# Patient Record
Sex: FEMALE | Race: WHITE | NOT HISPANIC OR LATINO | Employment: OTHER | URBAN - METROPOLITAN AREA
[De-identification: names, ages, dates, MRNs, and addresses within clinical notes are randomized per-mention and may not be internally consistent; named-entity substitution may affect disease eponyms.]

---

## 2017-07-18 DIAGNOSIS — Z12.31 ENCOUNTER FOR SCREENING MAMMOGRAM FOR MALIGNANT NEOPLASM OF BREAST: ICD-10-CM

## 2017-08-09 ENCOUNTER — HOSPITAL ENCOUNTER (OUTPATIENT)
Dept: RADIOLOGY | Age: 52
Discharge: HOME | End: 2017-08-09
Payer: COMMERCIAL

## 2017-08-09 DIAGNOSIS — Z12.31 ENCOUNTER FOR SCREENING MAMMOGRAM FOR MALIGNANT NEOPLASM OF BREAST: ICD-10-CM

## 2017-08-09 PROCEDURE — G0202 SCR MAMMO BI INCL CAD: HCPCS

## 2017-10-10 ENCOUNTER — ALLSCRIPTS OFFICE VISIT (OUTPATIENT)
Dept: OTHER | Facility: OTHER | Age: 52
End: 2017-10-10

## 2017-10-16 LAB
HPV 18 (HISTORICAL): NOT DETECTED
HPV HIGH RISK 16/18 (HISTORICAL): NOT DETECTED
HPV16 (HISTORICAL): NOT DETECTED
PAP (HISTORICAL): NORMAL

## 2018-01-22 VITALS
WEIGHT: 164 LBS | BODY MASS INDEX: 27.32 KG/M2 | SYSTOLIC BLOOD PRESSURE: 130 MMHG | DIASTOLIC BLOOD PRESSURE: 82 MMHG | HEIGHT: 65 IN

## 2018-07-16 ENCOUNTER — TELEPHONE (OUTPATIENT)
Dept: OBGYN CLINIC | Facility: CLINIC | Age: 53
End: 2018-07-16

## 2018-07-16 DIAGNOSIS — Z12.39 SCREENING FOR MALIGNANT NEOPLASM OF BREAST: Primary | ICD-10-CM

## 2018-07-16 NOTE — PROGRESS NOTES
New order for mammo entered, cannot discontinue old order because attached to appointment, but old order not signed

## 2018-08-14 ENCOUNTER — TELEPHONE (OUTPATIENT)
Dept: OBGYN CLINIC | Facility: CLINIC | Age: 53
End: 2018-08-14

## 2018-08-14 DIAGNOSIS — Z12.31 ENCOUNTER FOR SCREENING MAMMOGRAM FOR MALIGNANT NEOPLASM OF BREAST: Primary | ICD-10-CM

## 2018-08-15 ENCOUNTER — HOSPITAL ENCOUNTER (OUTPATIENT)
Dept: RADIOLOGY | Age: 53
Discharge: HOME | End: 2018-08-15
Payer: COMMERCIAL

## 2018-08-15 ENCOUNTER — TRANSCRIBE ORDERS (OUTPATIENT)
Dept: ADMINISTRATIVE | Age: 53
End: 2018-08-15

## 2018-08-15 DIAGNOSIS — Z12.31 ENCOUNTER FOR SCREENING MAMMOGRAM FOR MALIGNANT NEOPLASM OF BREAST: ICD-10-CM

## 2018-08-15 PROCEDURE — 77067 SCR MAMMO BI INCL CAD: CPT

## 2018-08-21 ENCOUNTER — HOSPITAL ENCOUNTER (OUTPATIENT)
Dept: ULTRASOUND IMAGING | Facility: CLINIC | Age: 53
Discharge: HOME | End: 2018-08-21
Payer: COMMERCIAL

## 2018-08-21 ENCOUNTER — HOSPITAL ENCOUNTER (OUTPATIENT)
Dept: MAMMOGRAPHY | Facility: CLINIC | Age: 53
Discharge: HOME | End: 2018-08-21
Payer: COMMERCIAL

## 2018-08-21 DIAGNOSIS — R92.8 ABNORMAL MAMMOGRAM: ICD-10-CM

## 2018-08-21 PROCEDURE — 76642 ULTRASOUND BREAST LIMITED: CPT

## 2018-08-21 PROCEDURE — G0279 TOMOSYNTHESIS, MAMMO: HCPCS

## 2018-08-21 PROCEDURE — 77065 DX MAMMO INCL CAD UNI: CPT

## 2018-10-23 ENCOUNTER — ANNUAL EXAM (OUTPATIENT)
Dept: OBGYN CLINIC | Facility: CLINIC | Age: 53
End: 2018-10-23
Payer: COMMERCIAL

## 2018-10-23 VITALS
WEIGHT: 167 LBS | SYSTOLIC BLOOD PRESSURE: 134 MMHG | BODY MASS INDEX: 27.82 KG/M2 | DIASTOLIC BLOOD PRESSURE: 76 MMHG | HEIGHT: 65 IN

## 2018-10-23 DIAGNOSIS — Z01.419 ENCOUNTER FOR GYNECOLOGICAL EXAMINATION (GENERAL) (ROUTINE) WITHOUT ABNORMAL FINDINGS: Primary | ICD-10-CM

## 2018-10-23 DIAGNOSIS — Z12.31 ENCOUNTER FOR SCREENING MAMMOGRAM FOR MALIGNANT NEOPLASM OF BREAST: ICD-10-CM

## 2018-10-23 PROCEDURE — S0612 ANNUAL GYNECOLOGICAL EXAMINA: HCPCS | Performed by: PHYSICIAN ASSISTANT

## 2018-10-23 RX ORDER — GUAIFENESIN, PSEUDOEPHEDRINE HYDROCHLORIDE 600; 60 MG/1; MG/1
1 TABLET, EXTENDED RELEASE ORAL EVERY 12 HOURS
COMMUNITY
End: 2019-02-19

## 2018-10-23 RX ORDER — DIPHENOXYLATE HYDROCHLORIDE AND ATROPINE SULFATE 2.5; .025 MG/1; MG/1
1 TABLET ORAL DAILY
COMMUNITY

## 2018-10-23 RX ORDER — FEXOFENADINE HCL 180 MG/1
180 TABLET ORAL DAILY
COMMUNITY
End: 2019-02-19

## 2018-10-23 NOTE — PROGRESS NOTES
Assessment/Plan   Diagnoses and all orders for this visit:    Encounter for gynecological examination (general) (routine) without abnormal findings  The current ASCCP guidelines were reviewed  Patient's last pap was 10/10/17 - WNL (-) HRHPV type 16/18 negative and therefore, a pap with HPV cotesting is not indicated at this time  I emphasized the importance of an annual pelvic and breast exam  Patient ok to defer pap today  Encounter for screening mammogram for malignant neoplasm of breast  -     Mammo screening bilateral w 3d & cad; Future  A yearly mammogram is recommended for breast cancer screening starting at age 36;    Discussion  I have discussed the importance of monthly self-breast exams, exercise and healthy diet as well as adequate intake of calcium and vitamin D  Encourage MVI q day and r/laura importance of folic acid; Encourage 30-40 min weight bearing exercise most days of week  Encourage safe sexual practices; STD testing - declines  Contraception - withdrawal - patient aware contraception advised until in menopause  Reviewed perimenopause/menopause in great detail and what to expect  Reviewed when to call if abnormal bleeding or  bleeding presents  Patient expressed understanding  In addition, colon cancer screening with a colonoscopy is recommended starting at age 39 and reviewed benefits - patient is up to date with current colonoscopy screening  All questions have been answered to her satisfaction  RTO for APE or sooner if needed      Subjective     HPI   Douglas Ramon is a 52 y o  female who presents for annual well woman exam    Menarche - 14/15; LMP - 5/15/18; Periods are irreg for the past year - coming every couple months; the longest she has gone is this current interval of 5 months now and lasts 4 days; 2nd-3rd day very heavy with clots; No excessive bleeding; No intermenstrual bleeding or spotting; Cramps are tolerable  No vulvar itch/burn; No vaginal itch/burn;  No abn discharge or odor; No urinary sx - burning/pain/frequency/hematuria  (+) SBEs - no breast masses, asymmetry, nipple discharge or bleeding, changes in skin of breast, or breast tenderness bilaterally  No abd/pelvic pain or HAs;   (+) menopausal symptoms: (+) hot flashes/night sweats - worse at night - tolerable at this time; no problems with intercourse, vaginal dryness; sleeping ok; Pt is sexually active in a mutually monog/ sexual relationship; No issues with intercourse; She declines std/hiv/hep testing; Feels safe at home  Current contraception: none - withdrawal  (+) PCP for routine Bw/care; Last Pap - 10/10/17 - WNL (-) HRHPV type 16/18 negative  History of abnormal Pap smear: no  Last mammo - 8/15/18 - Left breast asymmetry; R br WNL; 8/21/18 - L dx mammo and u/s - summation artifact - Birads 1 negative - B/L screen in 1 year  History of abnormal mammogram: yes  Last colonoscopy - patient is up to date with colonoscopy screening through colorectal surgeon - follows regularly due to history of colon removed - last time she had done she was advised to return in 10 years    Review of Systems   Constitutional: Negative for activity change, fatigue, fever and unexpected weight change  HENT: Negative for congestion, dental problem, sinus pain and sinus pressure  Eyes: Negative for visual disturbance  Respiratory: Negative for cough, shortness of breath and wheezing  Cardiovascular: Negative for chest pain and leg swelling  Gastrointestinal: Negative for abdominal distention, abdominal pain, blood in stool, constipation, diarrhea, nausea and vomiting  Endocrine: Negative for polydipsia  Genitourinary: Negative for difficulty urinating, dyspareunia, dysuria, frequency, hematuria, menstrual problem, pelvic pain, urgency, vaginal bleeding, vaginal discharge and vaginal pain  Musculoskeletal: Negative for arthralgias and back pain  Allergic/Immunologic: Negative for environmental allergies  Neurological: Negative for dizziness, seizures and headaches  Psychiatric/Behavioral: Negative for dysphoric mood and sleep disturbance  The patient is not nervous/anxious  The following portions of the patient's history were reviewed and updated as appropriate: allergies, current medications, past family history, past medical history, past social history, past surgical history and problem list          OB History      Para Term  AB Living    2 1     1      SAB TAB Ectopic Multiple Live Births                     Obstetric Comments    : 24  M ; 1 VIP          Past Medical History:   Diagnosis Date    Seasonal allergies        Past Surgical History:   Procedure Laterality Date    COLON SURGERY      ruptured appendix and colon - 1 foot of colon removed    LAMINECTOMY FOR IMPLANTATION / PLACEMENT NEUROSTIMULATOR ELECTRODES      SJ3       Family History   Problem Relation Age of Onset    Heart attack Mother 46        fatal    Diabetes Mother     Colon cancer Father     Prostate cancer Father     Cancer Maternal Grandmother         Gastric cancer       Social History     Social History    Marital status: /Civil Union     Spouse name: N/A    Number of children: N/A    Years of education: N/A     Occupational History    Not on file       Social History Main Topics    Smoking status: Never Smoker    Smokeless tobacco: Never Used    Alcohol use Yes    Drug use: No    Sexual activity: Yes     Partners: Male     Birth control/ protection: Coitus interruptus     Other Topics Concern    Not on file     Social History Narrative    No narrative on file         Current Outpatient Prescriptions:     fexofenadine (ALLEGRA) 180 MG tablet, Take 180 mg by mouth daily, Disp: , Rfl:     multivitamin (THERAGRAN) TABS, Take 1 tablet by mouth daily, Disp: , Rfl:     pseudoephedrine-guaifenesin (MUCINEX D)  MG per tablet, Take 1 tablet by mouth every 12 (twelve) hours, Disp: , Rfl:     Allergies   Allergen Reactions    Erythromycin        Objective   Vitals:    10/23/18 1008   BP: 134/76   BP Location: Left arm   Patient Position: Sitting   Cuff Size: Adult   Weight: 75 8 kg (167 lb)   Height: 5' 5" (1 651 m)     Physical Exam   Constitutional: She appears well-developed and well-nourished  No distress  Neck: No thyromegaly present  Cardiovascular: Normal rate, regular rhythm and normal heart sounds  No murmur heard  Pulmonary/Chest: Effort normal and breath sounds normal  No respiratory distress  She has no wheezes  Right breast exhibits no inverted nipple, no mass, no nipple discharge, no skin change and no tenderness  Left breast exhibits no inverted nipple, no mass, no nipple discharge, no skin change and no tenderness  Breasts are symmetrical    Abdominal: Soft  She exhibits no distension and no mass  There is no tenderness  Genitourinary: Vagina normal and uterus normal  Rectal exam shows no mass, no tenderness, anal tone normal and guaiac negative stool  Pelvic exam was performed with patient supine  There is no rash, tenderness or lesion on the right labia  There is no rash, tenderness or lesion on the left labia  Uterus is not deviated, not enlarged, not fixed and not tender  Cervix exhibits no motion tenderness, no discharge and no friability  Right adnexum displays no mass, no tenderness and no fullness  Left adnexum displays no mass, no tenderness and no fullness  No erythema, tenderness or bleeding in the vagina  No foreign body in the vagina  No vaginal discharge found  Musculoskeletal: She exhibits no edema  Lymphadenopathy:     She has no cervical adenopathy  She has no axillary adenopathy  Right: No inguinal adenopathy present  Left: No inguinal adenopathy present  Neurological: She is alert  Skin: Skin is warm  She is not diaphoretic  Psychiatric: She has a normal mood and affect   Her behavior is normal    Vitals reviewed

## 2018-10-23 NOTE — ASSESSMENT & PLAN NOTE
The current ASCCP guidelines were reviewed  Patient's last pap was 10/10/17 - WNL (-) HRHPV type 16/18 negative and therefore, a pap with HPV cotesting is not indicated at this time  I emphasized the importance of an annual pelvic and breast exam  Patient ok to defer pap today

## 2019-02-14 NOTE — H&P (VIEW-ONLY)
Colon and Rectal Surgery   Nadira Rodriguez 52 y o  female MRN: [id number]   Encounter: [id number]  02/14/19   8:44 AM      ASSESSMENT:  Nadira Rodriguez is a 52 y o  female who presents with Interstim with good control of her bowel movement, but her Interstim stimulator has declined in power with the battery life of less than 10 months, as checked and interrogated earlier by the [employer] rep  She maintains the stimulator at 4 5, with good control and no discomfort  PLAN:  To schedule replacement of the stimulator           HPI  Nadira Rodriguez is a 52 y o  female to discuss her battery change for her stimulator, today she feels fine she reports 1-2 bowel movements daily that are soft and formed, she denies any fecal leakage  Stage II interstim insertion of permanent stimulator on 4/14/15  Her last colonoscopy was 1/31/2015 and showed benign polyps              Historical Information   Past Medical History:   Diagnosis Date    Seasonal allergies      Past Surgical History:   Procedure Laterality Date    COLON SURGERY      ruptured appendix and colon - 1 foot of colon removed    LAMINECTOMY FOR IMPLANTATION / PLACEMENT NEUROSTIMULATOR ELECTRODES  2015    SJ3       Meds/Allergies       Current Outpatient Medications:     fexofenadine (ALLEGRA) 180 MG tablet, Take 180 mg by mouth daily, Disp: , Rfl:     multivitamin (THERAGRAN) TABS, Take 1 tablet by mouth daily, Disp: , Rfl:     pseudoephedrine-guaifenesin (MUCINEX D)  MG per tablet, Take 1 tablet by mouth every 12 (twelve) hours, Disp: , Rfl:       Allergies   Allergen Reactions    Erythromycin Vomiting         Social History   Social History     Substance and Sexual Activity   Alcohol Use Yes     Social History     Substance and Sexual Activity   Drug Use No     Social History     Tobacco Use   Smoking Status Never Smoker   Smokeless Tobacco Never Used         Family History:   Family History   Problem Relation Age of Onset    Heart attack Mother 46        fatal    Diabetes Mother     Colon cancer Father     Prostate cancer Father     Cancer Maternal Grandmother         Gastric cancer       Review of Systems    Objective     Current Vitals: There were no vitals filed for this visit        Physical Exam:  General:no distress  Eyes:perrla/eomi  ENT:moist mucus membranes  Neck:supple  Pulm:no increased work of breathing  CV:sinus  Abdomen:soft,nontender  Rectal:normal perianal skin/sphincter tone, no masses palpated, the stimulator on the right glutreal region  Extremities:no edema  Lymphatics:no neck/axillary/groin lymphadenopathy

## 2019-02-18 PROBLEM — R15.9 ANAL SPHINCTER INCONTINENCE: Status: ACTIVE | Noted: 2019-02-18

## 2019-02-19 NOTE — PRE-PROCEDURE INSTRUCTIONS
Pre-Surgery Instructions:   Medication Instructions    multivitamin (THERAGRAN) TABS Instructed patient per Anesthesia Guidelines   [DISCONTINUED] fexofenadine (ALLEGRA) 180 MG tablet Instructed patient per Anesthesia Guidelines

## 2019-02-25 ENCOUNTER — ANESTHESIA EVENT (OUTPATIENT)
Dept: PERIOP | Facility: HOSPITAL | Age: 54
End: 2019-02-25
Payer: COMMERCIAL

## 2019-02-26 ENCOUNTER — HOSPITAL ENCOUNTER (OUTPATIENT)
Facility: HOSPITAL | Age: 54
Setting detail: OUTPATIENT SURGERY
Discharge: HOME | End: 2019-02-26
Attending: COLON & RECTAL SURGERY | Admitting: COLON & RECTAL SURGERY
Payer: COMMERCIAL

## 2019-02-26 ENCOUNTER — ANESTHESIA (OUTPATIENT)
Dept: PERIOP | Facility: HOSPITAL | Age: 54
End: 2019-02-26
Payer: COMMERCIAL

## 2019-02-26 ENCOUNTER — APPOINTMENT (OUTPATIENT)
Dept: RADIOLOGY | Facility: HOSPITAL | Age: 54
End: 2019-02-26
Payer: COMMERCIAL

## 2019-02-26 VITALS
BODY MASS INDEX: 28.16 KG/M2 | TEMPERATURE: 100.3 F | RESPIRATION RATE: 18 BRPM | DIASTOLIC BLOOD PRESSURE: 80 MMHG | SYSTOLIC BLOOD PRESSURE: 122 MMHG | HEIGHT: 65 IN | WEIGHT: 169 LBS | HEART RATE: 64 BPM | OXYGEN SATURATION: 98 %

## 2019-02-26 LAB — EXT PREGNANCY TEST URINE: NEGATIVE

## 2019-02-26 PROCEDURE — 81025 URINE PREGNANCY TEST: CPT | Performed by: COLON & RECTAL SURGERY

## 2019-02-26 PROCEDURE — C1767 GENERATOR, NEURO NON-RECHARG: HCPCS | Performed by: COLON & RECTAL SURGERY

## 2019-02-26 PROCEDURE — C1787 PATIENT PROGR, NEUROSTIM: HCPCS | Performed by: COLON & RECTAL SURGERY

## 2019-02-26 PROCEDURE — 64590 INS/RPL PRPH SAC/GSTR NPG/R: CPT | Performed by: COLON & RECTAL SURGERY

## 2019-02-26 PROCEDURE — 95972 ALYS CPLX SP/PN NPGT W/PRGRM: CPT | Performed by: COLON & RECTAL SURGERY

## 2019-02-26 DEVICE — NEUROSTIMULATOR INTERSTIM 2: Type: IMPLANTABLE DEVICE | Site: BUTTOCKS | Status: FUNCTIONAL

## 2019-02-26 RX ORDER — FENTANYL CITRATE/PF 50 MCG/ML
25 SYRINGE (ML) INJECTION
Status: DISCONTINUED | OUTPATIENT
Start: 2019-02-26 | End: 2019-02-26 | Stop reason: HOSPADM

## 2019-02-26 RX ORDER — SODIUM CHLORIDE, SODIUM LACTATE, POTASSIUM CHLORIDE, CALCIUM CHLORIDE 600; 310; 30; 20 MG/100ML; MG/100ML; MG/100ML; MG/100ML
INJECTION, SOLUTION INTRAVENOUS CONTINUOUS PRN
Status: DISCONTINUED | OUTPATIENT
Start: 2019-02-26 | End: 2019-02-26 | Stop reason: SURG

## 2019-02-26 RX ORDER — ONDANSETRON 2 MG/ML
4 INJECTION INTRAMUSCULAR; INTRAVENOUS ONCE AS NEEDED
Status: DISCONTINUED | OUTPATIENT
Start: 2019-02-26 | End: 2019-02-26 | Stop reason: HOSPADM

## 2019-02-26 RX ORDER — PROPOFOL 10 MG/ML
INJECTION, EMULSION INTRAVENOUS AS NEEDED
Status: DISCONTINUED | OUTPATIENT
Start: 2019-02-26 | End: 2019-02-26 | Stop reason: SURG

## 2019-02-26 RX ORDER — SODIUM CHLORIDE, SODIUM LACTATE, POTASSIUM CHLORIDE, CALCIUM CHLORIDE 600; 310; 30; 20 MG/100ML; MG/100ML; MG/100ML; MG/100ML
125 INJECTION, SOLUTION INTRAVENOUS CONTINUOUS
Status: DISCONTINUED | OUTPATIENT
Start: 2019-02-26 | End: 2019-02-26 | Stop reason: HOSPADM

## 2019-02-26 RX ORDER — SODIUM CHLORIDE, SODIUM LACTATE, POTASSIUM CHLORIDE, CALCIUM CHLORIDE 600; 310; 30; 20 MG/100ML; MG/100ML; MG/100ML; MG/100ML
20 INJECTION, SOLUTION INTRAVENOUS CONTINUOUS
Status: DISCONTINUED | OUTPATIENT
Start: 2019-02-26 | End: 2019-02-26 | Stop reason: HOSPADM

## 2019-02-26 RX ORDER — IBUPROFEN 400 MG/1
400 TABLET ORAL EVERY 6 HOURS PRN
Status: DISCONTINUED | OUTPATIENT
Start: 2019-02-26 | End: 2019-02-26 | Stop reason: HOSPADM

## 2019-02-26 RX ORDER — LIDOCAINE HYDROCHLORIDE 10 MG/ML
INJECTION, SOLUTION INFILTRATION; PERINEURAL AS NEEDED
Status: DISCONTINUED | OUTPATIENT
Start: 2019-02-26 | End: 2019-02-26 | Stop reason: SURG

## 2019-02-26 RX ORDER — MIDAZOLAM HYDROCHLORIDE 1 MG/ML
INJECTION INTRAMUSCULAR; INTRAVENOUS AS NEEDED
Status: DISCONTINUED | OUTPATIENT
Start: 2019-02-26 | End: 2019-02-26 | Stop reason: SURG

## 2019-02-26 RX ORDER — PROPOFOL 10 MG/ML
INJECTION, EMULSION INTRAVENOUS CONTINUOUS PRN
Status: DISCONTINUED | OUTPATIENT
Start: 2019-02-26 | End: 2019-02-26 | Stop reason: SURG

## 2019-02-26 RX ORDER — BUPIVACAINE HYDROCHLORIDE 2.5 MG/ML
INJECTION, SOLUTION INFILTRATION; PERINEURAL AS NEEDED
Status: DISCONTINUED | OUTPATIENT
Start: 2019-02-26 | End: 2019-02-26 | Stop reason: HOSPADM

## 2019-02-26 RX ORDER — FENTANYL CITRATE 50 UG/ML
INJECTION, SOLUTION INTRAMUSCULAR; INTRAVENOUS AS NEEDED
Status: DISCONTINUED | OUTPATIENT
Start: 2019-02-26 | End: 2019-02-26 | Stop reason: SURG

## 2019-02-26 RX ORDER — FEXOFENADINE HYDROCHLORIDE 60 MG/1
60 TABLET, FILM COATED ORAL DAILY
COMMUNITY
End: 2019-10-24

## 2019-02-26 RX ADMIN — SODIUM CHLORIDE, SODIUM LACTATE, POTASSIUM CHLORIDE, AND CALCIUM CHLORIDE: .6; .31; .03; .02 INJECTION, SOLUTION INTRAVENOUS at 09:02

## 2019-02-26 RX ADMIN — Medication 2000 MG: at 09:15

## 2019-02-26 RX ADMIN — PROPOFOL 100 MCG/KG/MIN: 10 INJECTION, EMULSION INTRAVENOUS at 09:10

## 2019-02-26 RX ADMIN — FENTANYL CITRATE 25 MCG: 50 INJECTION, SOLUTION INTRAMUSCULAR; INTRAVENOUS at 10:18

## 2019-02-26 RX ADMIN — LIDOCAINE HYDROCHLORIDE 50 MG: 10 INJECTION, SOLUTION INFILTRATION; PERINEURAL at 09:10

## 2019-02-26 RX ADMIN — MIDAZOLAM 2 MG: 1 INJECTION INTRAMUSCULAR; INTRAVENOUS at 09:02

## 2019-02-26 RX ADMIN — SODIUM CHLORIDE, SODIUM LACTATE, POTASSIUM CHLORIDE, AND CALCIUM CHLORIDE 125 ML/HR: .6; .31; .03; .02 INJECTION, SOLUTION INTRAVENOUS at 08:03

## 2019-02-26 RX ADMIN — FENTANYL CITRATE 25 MCG: 50 INJECTION, SOLUTION INTRAMUSCULAR; INTRAVENOUS at 10:25

## 2019-02-26 RX ADMIN — PROPOFOL 40 MG: 10 INJECTION, EMULSION INTRAVENOUS at 09:10

## 2019-02-26 RX ADMIN — FENTANYL CITRATE 25 MCG: 50 INJECTION, SOLUTION INTRAMUSCULAR; INTRAVENOUS at 09:15

## 2019-02-26 RX ADMIN — PROPOFOL 30 MG: 10 INJECTION, EMULSION INTRAVENOUS at 09:24

## 2019-02-26 NOTE — ANESTHESIA PREPROCEDURE EVALUATION
Review of Systems/Medical History  Patient summary reviewed        Cardiovascular  Negative cardio ROS    Pulmonary    Comment: Seasonal allergies     GI/Hepatic      Comment: Anal sphincter incontinence     Negative  ROS        Endo/Other  Negative endo/other ROS      GYN  Negative gynecology ROS          Hematology  Negative hematology ROS      Musculoskeletal  Negative musculoskeletal ROS        Neurology  Negative neurology ROS      Psychology   Negative psychology ROS              Physical Exam    Airway    Mallampati score: III  TM Distance: >3 FB  Neck ROM: full     Dental   No notable dental hx     Cardiovascular  Comment: Negative ROS,     Pulmonary      Other Findings        Anesthesia Plan  ASA Score- 2     Anesthesia Type- IV sedation with anesthesia with ASA Monitors  Additional Monitors:   Airway Plan:         Plan Factors-    Induction- intravenous  Postoperative Plan-     Informed Consent- Anesthetic plan and risks discussed with patient  I personally reviewed this patient with the CRNA  Discussed and agreed on the Anesthesia Plan with the CRNA  Sofia Olivera

## 2019-02-26 NOTE — ANESTHESIA POSTPROCEDURE EVALUATION
Post-Op Assessment Note    CV Status:  Stable  Pain Score: 0    Pain management: adequate     Mental Status:  Alert and awake   Hydration Status:  Euvolemic   PONV Controlled:  Controlled   Airway Patency:  Patent   Post Op Vitals Reviewed: Yes      Staff: CRNA           BP   102/61   Temp   97 7   Pulse  67   Resp   16   SpO2   97% RA

## 2019-02-26 NOTE — DISCHARGE INSTRUCTIONS
Care For Your Absorbable Stitches   WHAT YOU NEED TO KNOW:   Absorbable stitches, or sutures, are used to close cuts or wounds  These stitches are absorbed by your body, or fall off on their own within days or weeks  They do not need to be removed  DISCHARGE INSTRUCTIONS:   Return to the emergency department if:   · Your wound comes apart  · You have red streaks around your wound  · You have swollen or painful lymph nodes  · Blood soaks through your bandage  Contact your healthcare provider if:   · You have signs of an infection, such as increased redness, pain, swelling, or pus  · You have a fever  · Your stitches do not absorb when your healthcare provider says they should  · You have questions or concerns about your condition or care  Care for your wound and absorbable stitches as directed:   · Protect your wound from further injury  Wear protective clothing over your wound, and protect it from sunlight  Do not place pressure on your wound  This could reopen your wound and increase your risk for an infection  · Elevate your wound  Keep your wound above the level of your heart as often as you can  This will help decrease swelling and pain  Prop your wounded area on pillows or blankets, if possible, to keep it elevated comfortably  · Wash and bandage your wound  Carefully wash the wound with soap and water  Gently dry the area and put on new, clean bandages as directed  Change your bandages when they get wet or dirty  · Take showers instead of baths  Wait 12 to 24 hours after you receive your stitches before you take a shower  Do not take a bath or swim  Follow up with your healthcare provider as directed:  Write down your questions so you remember to ask them during your visits  © 2017 [employer] Information is for End User's use only and may not be sold, redistributed or otherwise used for commercial purposes   All illustrations and images included in CareNotes® are the copyrighted property of A D A M , Inc  or Azael Osborne  The above information is an  only  It is not intended as medical advice for individual conditions or treatments  Talk to your doctor, nurse or pharmacist before following any medical regimen to see if it is safe and effective for you

## 2019-02-26 NOTE — OP NOTE
OPERATIVE REPORT  PATIENT NAME: Jun Rocha    :  1965  MRN: [id number]  Pt Location: [facility] OR ROOM 10    SURGERY DATE: 2019    Surgeon(s) and Role:     * Breana Castano MD - Primary    Preop Diagnosis:  Anal sphincter incontinence [R15 9]    Post-Op Diagnosis Codes:     * Anal sphincter incontinence [R15 9]    Procedure(s) (LRB):  CHANGE OF THE SACRAL NEUROSTIMULATOR IMPLANT (Right)    Specimen(s):  * No specimens in log *    Estimated Blood Loss:   Minimal    Drains:  * No LDAs found *    Anesthesia Type:   IV Sedation with Anesthesia    Operative Indications:  Anal sphincter incontinence [R15 9]  Death of the battery of sacral neuro stimulator implant    Operative Findings:  Nerve stimulator implant could be easily removed out of its capsule  The neurostimulator was changed after removal of the leads out of the old stimulator cleaned out, and replaced in the new 1  The neurostimulator was placed back into the wound and the stimulator and the leads were interrogated and impedance was checked to be satisfactory on 4 leads  Complications:   None    Procedure and Technique:  Patient after proper facial nerve identification on prone position under mild IV sedation the right upper gluteal area was well prepped by ChloraPrep and she was draped in sterile usual manner  Time-out was performed  Through the original scar line after injection of 8 cc Marcaine with epinephrine the skin was opened and deepened down to the capsule of the original neurostimulator  The neurostimulator was removed out out of his cavity and capsule and cleaned out  Using the hex screwdriver the leads were loosened out of the neurostimulator and pulled out and cleaned  The new sacral neurostimulator was replaced and as the lead was inserted and tightened was replaced into the original wound and capsule without difficulty with the wire under the stimulator      The stimulator was interrogated and the impedance was checked which was quite satisfactory on all 4 leads with good connections  The wound was irrigated clean with water solution  Hemostasis was well affected  The skin was closed using 4 O Monocryl subcuticular running suture  Histacryl and Tegaderm was applied  Sponge and counts were correct     I was present for the entire procedure    Patient Disposition:  PACU     SIGNATURE: Cornel Contreras MD  DATE: February 26, 2019  TIME: 10:01 AM

## 2019-08-21 ENCOUNTER — HOSPITAL ENCOUNTER (OUTPATIENT)
Dept: RADIOLOGY | Age: 54
Discharge: HOME | End: 2019-08-21
Payer: COMMERCIAL

## 2019-08-21 VITALS — WEIGHT: 165 LBS | BODY MASS INDEX: 27.49 KG/M2 | HEIGHT: 65 IN

## 2019-08-21 DIAGNOSIS — Z12.31 ENCOUNTER FOR SCREENING MAMMOGRAM FOR MALIGNANT NEOPLASM OF BREAST: ICD-10-CM

## 2019-08-21 PROCEDURE — 77063 BREAST TOMOSYNTHESIS BI: CPT

## 2019-08-21 PROCEDURE — 77067 SCR MAMMO BI INCL CAD: CPT

## 2019-10-24 ENCOUNTER — OFFICE VISIT (OUTPATIENT)
Dept: OBGYN CLINIC | Facility: CLINIC | Age: 54
End: 2019-10-24
Payer: COMMERCIAL

## 2019-10-24 VITALS
SYSTOLIC BLOOD PRESSURE: 134 MMHG | BODY MASS INDEX: 28.66 KG/M2 | WEIGHT: 172 LBS | HEIGHT: 65 IN | DIASTOLIC BLOOD PRESSURE: 82 MMHG

## 2019-10-24 DIAGNOSIS — Z01.419 ENCOUNTER FOR GYNECOLOGICAL EXAMINATION (GENERAL) (ROUTINE) WITHOUT ABNORMAL FINDINGS: Primary | ICD-10-CM

## 2019-10-24 PROCEDURE — S0612 ANNUAL GYNECOLOGICAL EXAMINA: HCPCS | Performed by: PHYSICIAN ASSISTANT

## 2019-10-24 RX ORDER — CETIRIZINE HYDROCHLORIDE 10 MG/1
10 TABLET ORAL DAILY
COMMUNITY
End: 2020-10-27

## 2019-10-24 NOTE — PROGRESS NOTES
Assessment/Plan   Diagnoses and all orders for this visit:    Encounter for gynecological examination (general) (routine) without abnormal findings  The current ASCCP guidelines were reviewed  Patient's last pap was 10/10/17 - WNL (-) HRHPV type 16/18 negative and therefore, a pap with HPV cotesting is not indicated at this time  I emphasized the importance of an annual pelvic and breast exam  Patient ok to defer pap today  Discussion  Reviewed perimenopause/menopause in detail with patient and she is aware will be considered menopausal once she is one full year without any bleeding/spotting  She is also aware we recommend some type of birth control, ie condoms, until she is considered menopausal to prevent any chance of pregnancy  I have discussed the importance of monthly self-breast exams, exercise and healthy diet as well as adequate intake of calcium and vitamin D  Encourage MVI q day and r/laura importance of folic acid; Encourage 30-40 min weight bearing exercise most days of week  Encourage safe sexual practices; STD testing - declines  Contraception - withdrawal  A yearly mammogram is recommended for breast cancer screening starting at age 36 - order in system and appointment scheduled for next year; In addition, colon cancer screening with a colonoscopy is recommended starting at age 39 and reviewed benefits - patient is up to date and follows with her colorectal surgeon regularly  All questions have been answered to her satisfaction  RTO for APE or sooner if needed    Subjective     HPI   Celestina Garrido is a 62 y o  female who presents for annual well woman exam    Menarche - 11; LMP - 6/3/19; Periods are irreg for the past couple years - goes as long as 6-9 months; last 3-4 days; No excessive bleeding; No intermenstrual bleeding or spotting; Cramps are tolerable with Motrin  No vulvar itch/burn; No vaginal itch/burn; No abn discharge or odor;  No urinary sx - burning/pain/frequency/hematuria  (+) SBEs - no breast masses, asymmetry, nipple discharge or bleeding, changes in skin of breast, or breast tenderness bilaterally  No abd/pelvic pain or HAs;   (+) menopausal symptoms: No hot flashes; (+) night sweats - tolerable at this time; no problems with intercourse, vaginal dryness; sleeping well;   Pt is sexually active in a mutually monog/ sexual relationship; No issues with intercourse; She declines std/hiv/hep testing; Feels safe at home  Current contraception: none - withdrawal  (+) PCP for routine Bw/care; Last Pap - 10/10/17 - WNL (-) HRHPV type 16/18 negative  History of abnormal Pap smear: no  Last mammo - 8/21/19 - Birads 1 negative  History of abnormal mammogram: yes  Last colonoscopy - patient is up to date with colonoscopy screening through colorectal surgeon - follows regularly due to history of colon removed - last time she had done she was advised to return in 10 years    Review of Systems   Constitutional: Negative for activity change, fatigue, fever and unexpected weight change  HENT: Negative for congestion, dental problem, sinus pressure and sinus pain  Eyes: Negative for visual disturbance  Respiratory: Negative for cough, shortness of breath and wheezing  Cardiovascular: Negative for chest pain and leg swelling  Gastrointestinal: Negative for abdominal distention, abdominal pain, blood in stool, constipation, diarrhea, nausea and vomiting  Endocrine: Negative for polydipsia  Genitourinary: Negative for difficulty urinating, dyspareunia, dysuria, frequency, hematuria, menstrual problem, pelvic pain, urgency, vaginal bleeding, vaginal discharge and vaginal pain  Musculoskeletal: Negative for arthralgias and back pain  Allergic/Immunologic: Negative for environmental allergies  Neurological: Negative for dizziness, seizures and headaches  Psychiatric/Behavioral: Negative for dysphoric mood and sleep disturbance  The patient is not nervous/anxious          The following portions of the patient's history were reviewed and updated as appropriate: allergies, current medications, past family history, past medical history, past social history, past surgical history and problem list          OB History        2    Para   1    Term   0            AB   1    Living           SAB        TAB        Ectopic        Multiple        Live Births               Obstetric Comments   : 24  M ; 1 VIP             Past Medical History:   Diagnosis Date    Seasonal allergies        Past Surgical History:   Procedure Laterality Date    COLON SURGERY      ruptured appendix and colon - 1 foot of colon removed    LAMINECTOMY FOR IMPLANTATION / PLACEMENT NEUROSTIMULATOR ELECTRODES      SJ3    [facility] IMPLANT PERIPH/GASTRIC NEUROSTIM/ Right 2019    Procedure: CHANGE OF THE SACRAL NEUROSTIMULATOR IMPLANT;  Surgeon: Tish España MD;  Location:  MAIN OR;  Service: Colorectal       Family History   Problem Relation Age of Onset    Heart attack Mother 46        fatal    Diabetes Mother     Colon cancer Father     Prostate cancer Father 72    Cancer Maternal Grandmother         Gastric cancer    No Known Problems Sister     No Known Problems Maternal Aunt     No Known Problems Maternal Aunt     No Known Problems Maternal Aunt     No Known Problems Paternal Aunt     No Known Problems Paternal Aunt     No Known Problems Paternal Aunt        Social History     Socioeconomic History    Marital status: /Civil Union     Spouse name: Not on file    Number of children: Not on file    Years of education: Not on file    Highest education level: Not on file   Occupational History    Not on file   Social Needs    Financial resource strain: Not on file    Food insecurity:     Worry: Not on file     Inability: Not on file    Transportation needs:     Medical: Not on file     Non-medical: Not on file   Tobacco Use    Smoking status: Never Smoker  Smokeless tobacco: Never Used   Substance and Sexual Activity    Alcohol use: Yes     Frequency: 2-4 times a month     Drinks per session: 1 or 2     Binge frequency: Never     Comment: social    Drug use: No    Sexual activity: Yes     Partners: Male     Birth control/protection: Coitus interruptus   Lifestyle    Physical activity:     Days per week: Not on file     Minutes per session: Not on file    Stress: Not on file   Relationships    Social connections:     Talks on phone: Not on file     Gets together: Not on file     Attends Zoroastrianism service: Not on file     Active member of club or organization: Not on file     Attends meetings of clubs or organizations: Not on file     Relationship status: Not on file    Intimate partner violence:     Fear of current or ex partner: Not on file     Emotionally abused: Not on file     Physically abused: Not on file     Forced sexual activity: Not on file   Other Topics Concern    Not on file   Social History Narrative    Not on file         Current Outpatient Medications:     cetirizine (ZyrTEC) 10 mg tablet, Take 10 mg by mouth daily, Disp: , Rfl:     multivitamin (THERAGRAN) TABS, Take 1 tablet by mouth daily, Disp: , Rfl:     Allergies   Allergen Reactions    Erythromycin Vomiting       Objective   Vitals:    10/24/19 0932   BP: 134/82   BP Location: Left arm   Patient Position: Sitting   Cuff Size: Standard   Weight: 78 kg (172 lb)   Height: 5' 5" (1 651 m)     Physical Exam   Constitutional: She appears well-developed and well-nourished  No distress  Neck: No thyromegaly present  Cardiovascular: Normal rate, regular rhythm and normal heart sounds  No murmur heard  Pulmonary/Chest: Effort normal and breath sounds normal  No respiratory distress  She has no wheezes  Right breast exhibits no inverted nipple, no mass, no nipple discharge, no skin change and no tenderness   Left breast exhibits no inverted nipple, no mass, no nipple discharge, no skin change and no tenderness  Breasts are symmetrical    Abdominal: Soft  She exhibits no distension and no mass  There is no tenderness  Genitourinary: Vagina normal and uterus normal  Rectal exam shows no mass, no tenderness, anal tone normal and guaiac negative stool  Pelvic exam was performed with patient supine  There is no rash, tenderness or lesion on the right labia  There is no rash, tenderness or lesion on the left labia  Uterus is not deviated, not enlarged, not fixed and not tender  Cervix exhibits no motion tenderness, no discharge and no friability  Right adnexum displays no mass, no tenderness and no fullness  Left adnexum displays no mass, no tenderness and no fullness  No erythema, tenderness or bleeding in the vagina  No foreign body in the vagina  No vaginal discharge found  Musculoskeletal: She exhibits no edema  Lymphadenopathy:     She has no cervical adenopathy  She has no axillary adenopathy  Right: No inguinal adenopathy present  Left: No inguinal adenopathy present  Neurological: She is alert  Skin: Skin is warm  She is not diaphoretic  Psychiatric: She has a normal mood and affect  Her behavior is normal    Vitals reviewed

## 2020-08-24 ENCOUNTER — HOSPITAL ENCOUNTER (OUTPATIENT)
Dept: RADIOLOGY | Age: 55
Discharge: HOME | End: 2020-08-24
Payer: COMMERCIAL

## 2020-08-24 VITALS — HEIGHT: 65 IN | WEIGHT: 130 LBS | BODY MASS INDEX: 21.66 KG/M2

## 2020-08-24 DIAGNOSIS — Z12.31 ENCOUNTER FOR SCREENING MAMMOGRAM FOR MALIGNANT NEOPLASM OF BREAST: ICD-10-CM

## 2020-08-24 PROCEDURE — 77067 SCR MAMMO BI INCL CAD: CPT

## 2020-08-24 PROCEDURE — 77063 BREAST TOMOSYNTHESIS BI: CPT

## 2020-10-27 ENCOUNTER — ANNUAL EXAM (OUTPATIENT)
Dept: OBGYN CLINIC | Facility: CLINIC | Age: 55
End: 2020-10-27
Payer: COMMERCIAL

## 2020-10-27 VITALS
DIASTOLIC BLOOD PRESSURE: 80 MMHG | BODY MASS INDEX: 21.49 KG/M2 | WEIGHT: 129 LBS | HEIGHT: 65 IN | SYSTOLIC BLOOD PRESSURE: 128 MMHG | TEMPERATURE: 96.9 F

## 2020-10-27 DIAGNOSIS — Z01.419 ENCOUNTER FOR GYNECOLOGICAL EXAMINATION (GENERAL) (ROUTINE) WITHOUT ABNORMAL FINDINGS: Primary | ICD-10-CM

## 2020-10-27 PROCEDURE — S0612 ANNUAL GYNECOLOGICAL EXAMINA: HCPCS | Performed by: PHYSICIAN ASSISTANT

## 2021-10-29 ENCOUNTER — ANNUAL EXAM (OUTPATIENT)
Dept: OBGYN CLINIC | Facility: CLINIC | Age: 56
End: 2021-10-29
Payer: COMMERCIAL

## 2021-10-29 VITALS
HEIGHT: 65 IN | BODY MASS INDEX: 22.66 KG/M2 | DIASTOLIC BLOOD PRESSURE: 74 MMHG | SYSTOLIC BLOOD PRESSURE: 118 MMHG | WEIGHT: 136 LBS

## 2021-10-29 DIAGNOSIS — Z01.419 ROUTINE GYNECOLOGICAL EXAMINATION: Primary | ICD-10-CM

## 2021-10-29 PROBLEM — N92.0 EXCESSIVE AND FREQUENT MENSTRUATION: Status: ACTIVE | Noted: 2021-10-29

## 2021-10-29 PROBLEM — Z12.31 ENCOUNTER FOR SCREENING MAMMOGRAM FOR BREAST CANCER: Status: ACTIVE | Noted: 2018-10-23

## 2021-10-29 PROCEDURE — G0476 HPV COMBO ASSAY CA SCREEN: HCPCS | Performed by: PHYSICIAN ASSISTANT

## 2021-10-29 PROCEDURE — G0145 SCR C/V CYTO,THINLAYER,RESCR: HCPCS | Performed by: PHYSICIAN ASSISTANT

## 2021-10-29 PROCEDURE — S0612 ANNUAL GYNECOLOGICAL EXAMINA: HCPCS | Performed by: PHYSICIAN ASSISTANT

## 2021-11-01 LAB
HPV HR 12 DNA CVX QL NAA+PROBE: NEGATIVE
HPV16 DNA CVX QL NAA+PROBE: NEGATIVE
HPV18 DNA CVX QL NAA+PROBE: NEGATIVE

## 2021-11-04 LAB
LAB AP GYN PRIMARY INTERPRETATION: NORMAL
LAB AP LMP: NORMAL
Lab: NORMAL

## 2021-11-12 ENCOUNTER — HOSPITAL ENCOUNTER (OUTPATIENT)
Dept: RADIOLOGY | Age: 56
Discharge: HOME | End: 2021-11-12
Payer: COMMERCIAL

## 2021-11-12 VITALS — HEIGHT: 65 IN | BODY MASS INDEX: 22.66 KG/M2 | WEIGHT: 136 LBS

## 2021-11-12 DIAGNOSIS — Z12.31 ENCOUNTER FOR SCREENING MAMMOGRAM FOR MALIGNANT NEOPLASM OF BREAST: ICD-10-CM

## 2021-11-12 PROCEDURE — 77067 SCR MAMMO BI INCL CAD: CPT

## 2021-11-12 PROCEDURE — 77063 BREAST TOMOSYNTHESIS BI: CPT

## 2022-09-28 ENCOUNTER — TELEPHONE (OUTPATIENT)
Dept: OBGYN CLINIC | Facility: CLINIC | Age: 57
End: 2022-09-28

## 2022-09-28 DIAGNOSIS — Z12.31 ENCOUNTER FOR SCREENING MAMMOGRAM FOR BREAST CANCER: Primary | ICD-10-CM

## 2022-10-12 PROBLEM — Z01.419 ROUTINE GYNECOLOGICAL EXAMINATION: Status: RESOLVED | Noted: 2021-10-29 | Resolved: 2022-10-12

## 2022-12-15 ENCOUNTER — ANNUAL EXAM (OUTPATIENT)
Dept: OBGYN CLINIC | Facility: CLINIC | Age: 57
End: 2022-12-15

## 2022-12-15 VITALS
BODY MASS INDEX: 24.16 KG/M2 | HEIGHT: 65 IN | DIASTOLIC BLOOD PRESSURE: 84 MMHG | WEIGHT: 145 LBS | SYSTOLIC BLOOD PRESSURE: 132 MMHG

## 2022-12-15 DIAGNOSIS — Z83.3 FAMILY HISTORY OF DIABETES MELLITUS: ICD-10-CM

## 2022-12-15 DIAGNOSIS — Z01.419 WELL WOMAN EXAM: Primary | ICD-10-CM

## 2022-12-15 DIAGNOSIS — Z12.31 ENCOUNTER FOR SCREENING MAMMOGRAM FOR MALIGNANT NEOPLASM OF BREAST: ICD-10-CM

## 2022-12-15 DIAGNOSIS — Z82.49 FAMILY HISTORY OF MI (MYOCARDIAL INFARCTION): ICD-10-CM

## 2022-12-15 PROBLEM — N92.0 EXCESSIVE AND FREQUENT MENSTRUATION: Status: RESOLVED | Noted: 2021-10-29 | Resolved: 2022-12-15

## 2022-12-15 NOTE — PROGRESS NOTES
[de-identified] [facility]  Well Woman Annual Exam  Vikash Regan MD  12/15/22    Assessment   59 y o  postmenopausal female presenting for annual exam- normal well woman exam today    Plan     1  The patient previously had NILM, HPV negative pap in   The current ASCCP guidelines were reviewed- patient OK with deferring pap today  The low risk patient will receive pap smear screening every 3 years or pap with HPV co-testing every 5 years  2  The patient declines STD testing  3  Last Mammogram: 2021- Birads 1, scheduled tomorrow for mammogram  Mammogram ordered today for next year  Reviewed ACOG recommendations of yearly mammogram for breast cancer screening   4  Last Colonoscopy , Patient due in  per patient  5  HBA1C and cholesterol panel ordered for family history of MI and DM  I emphasized the importance of an annual pelvic and breast exam    I have discussed the importance of monthly self-breast exams, exercise and healthy diet as well as adequate intake of calcium and vitamin D  All questions have been answered to her satisfaction  __________________________________________________________________      Subjective     59 y o  postmenopausal female presenting for annual exam  She is without complaint  GYN  Complaints: denies  Denies genital discharge, genital ulcers, pelvic pain and vulvar/vaginal symptoms  Menopause occurred at age 62  She has had no bleeding since this time    Menopausal symptoms: vaginal dryness  Sexually active: Yes,   Hx STI: denies   Hx Abnormal pap: denies  Last pap: 10/2021: Pap- NILM, HPV neg    OB      1    / GI  Complaints: denies  Denies urinary frequency, hematuria, urinary hesitancy, urinary retention, urinary incontinence and dysuria  Last colonoscopy in - wnl  Has stimulator placed for h/o fecal incontinence    BREAST  Complaints: denies  Denies: breast lump, breast tenderness, changed mole, dryness, nipple discharge, pruritus, rash, skin color change and skin lesion(s)  Last mammogram: 11/2021- Birads 1  Personal hx: none  Family hx: denies fhx of breast, uterine, ovarian, or colon cancers  Patient does do regular self-exams    GENERAL  PMH reviewed/updated and is as below  Patient does not follow with a PCP  Retired from teacher  Denies domestic violence  Exercise:  Active outdoors  Diet:  Well balanced diet    Past Medical History:   Diagnosis Date   • Seasonal allergies    • Skin cancer     foot       Past Surgical History:   Procedure Laterality Date   • COLON SURGERY      ruptured appendix and colon - 1 foot of colon removed   • LAMINECTOMY FOR IMPLANTATION / PLACEMENT NEUROSTIMULATOR ELECTRODES  2015    SJ3   • [facility] IMPLANT PERIPH/GASTRIC NEUROSTIM/ Right 2/26/2019    Procedure: CHANGE OF THE SACRAL NEUROSTIMULATOR IMPLANT;  Surgeon: Jenae Adkins MD;  Location: [facility];  Service: Colorectal         Current Outpatient Medications:   •  Lysine 1000 MG TABS, Take by mouth, Disp: , Rfl:   •  multivitamin (THERAGRAN) TABS, Take 1 tablet by mouth daily, Disp: , Rfl:   •  OIL OF OREGANO PO, Take by mouth, Disp: , Rfl:   •  Vitamin C-Vitamin D-Zinc (D3/VITAMIN C/ZINC PO), Take by mouth, Disp: , Rfl:   •  vitamin E 100 UNIT capsule, Take 100 Units by mouth daily, Disp: , Rfl:     Allergies   Allergen Reactions   • Erythromycin Vomiting       Social History     Socioeconomic History   • Marital status: /Civil Union     Spouse name: Not on file   • Number of children: Not on file   • Years of education: Not on file   • Highest education level: Not on file   Occupational History   • Not on file   Tobacco Use   • Smoking status: Never   • Smokeless tobacco: Never   Vaping Use   • Vaping Use: Never used   Substance and Sexual Activity   • Alcohol use: Yes     Comment: social   • Drug use: No   • Sexual activity: Yes     Partners: Male     Birth control/protection: Post-menopausal   Other Topics Concern   • Not on file   Social History Narrative   • Not on file     Social Determinants of Health     Financial Resource Strain: Not on file   Food Insecurity: Not on file   Transportation Needs: Not on file   Physical Activity: Not on file   Stress: Not on file   Social Connections: Not on file   Intimate Partner Violence: Not on file   Housing Stability: Not on file     Review of Systems  Per HPI    Objective  /84 (BP Location: Left arm, Patient Position: Sitting, Cuff Size: Standard)   Ht 5' 5" (1 651 m)   Wt 65 8 kg (145 lb)   LMP 06/03/2019 (Exact Date)   BMI 24 13 kg/m²      Physical Exam:  Physical Exam  Vitals reviewed  Constitutional:       General: She is not in acute distress  Appearance: She is well-developed  She is not diaphoretic  HENT:      Head: Normocephalic and atraumatic  Cardiovascular:      Rate and Rhythm: Normal rate and regular rhythm  Heart sounds: Normal heart sounds  No murmur heard  No friction rub  No gallop  Pulmonary:      Effort: Pulmonary effort is normal  No respiratory distress  Breath sounds: Normal breath sounds  No wheezing or rales  Abdominal:      Palpations: Abdomen is soft  Tenderness: There is no abdominal tenderness  There is no guarding or rebound  Genitourinary:     Vagina: Normal       Comments: Vulva with mild atrophy, no lesions, urethra midline without prolapse, vagina normal without lesions, cervix appears normal without lesions, Bimanual exam there is an anteverted mobile uterus, no adnexal masses or fullness  No tenderness on exam    Musculoskeletal:      Cervical back: Normal range of motion and neck supple  Right lower leg: No edema  Left lower leg: No edema  Skin:     General: Skin is warm and dry  Neurological:      Mental Status: She is alert and oriented to person, place, and time     Psychiatric:         Behavior: Behavior normal        Breast inspection negative, no nipple discharge or bleeding, no masses or nodularity palpable

## 2022-12-16 ENCOUNTER — HOSPITAL ENCOUNTER (OUTPATIENT)
Dept: RADIOLOGY | Age: 57
Discharge: HOME | End: 2022-12-16

## 2022-12-16 ENCOUNTER — APPOINTMENT (OUTPATIENT)
Dept: LAB | Age: 57
End: 2022-12-16

## 2022-12-16 VITALS — WEIGHT: 145 LBS | BODY MASS INDEX: 24.16 KG/M2 | HEIGHT: 65 IN

## 2022-12-16 DIAGNOSIS — Z12.31 ENCOUNTER FOR SCREENING MAMMOGRAM FOR BREAST CANCER: ICD-10-CM

## 2022-12-16 DIAGNOSIS — Z83.3 FAMILY HISTORY OF DIABETES MELLITUS: ICD-10-CM

## 2022-12-16 DIAGNOSIS — Z82.49 FAMILY HISTORY OF MI (MYOCARDIAL INFARCTION): ICD-10-CM

## 2022-12-16 DIAGNOSIS — Z01.419 WELL WOMAN EXAM: ICD-10-CM

## 2022-12-16 LAB
CHOLEST SERPL-MCNC: 180 MG/DL
EST. AVERAGE GLUCOSE BLD GHB EST-MCNC: 97 MG/DL
HBA1C MFR BLD: 5 %
HDLC SERPL-MCNC: 99 MG/DL
LDLC SERPL CALC-MCNC: 71 MG/DL (ref 0–100)
NONHDLC SERPL-MCNC: 81 MG/DL
TRIGL SERPL-MCNC: 50 MG/DL

## 2022-12-27 ENCOUNTER — APPOINTMENT (EMERGENCY)
Dept: RADIOLOGY | Facility: HOSPITAL | Age: 57
End: 2022-12-27

## 2022-12-27 ENCOUNTER — APPOINTMENT (EMERGENCY)
Dept: CT IMAGING | Facility: HOSPITAL | Age: 57
End: 2022-12-27

## 2022-12-27 ENCOUNTER — HOSPITAL ENCOUNTER (OUTPATIENT)
Facility: HOSPITAL | Age: 57
Setting detail: OBSERVATION
Discharge: HOME | End: 2022-12-28
Attending: EMERGENCY MEDICINE | Admitting: INTERNAL MEDICINE

## 2022-12-27 DIAGNOSIS — R55 SYNCOPE: Primary | ICD-10-CM

## 2022-12-27 DIAGNOSIS — S09.90XA TRAUMATIC INJURY OF HEAD, INITIAL ENCOUNTER: ICD-10-CM

## 2022-12-27 LAB
2HR DELTA HS TROPONIN: 0 NG/L
4HR DELTA HS TROPONIN: <0 NG/L
ALBUMIN SERPL BCP-MCNC: 4.5 G/DL (ref 3.5–5)
ALP SERPL-CCNC: 47 U/L (ref 34–104)
ALT SERPL W P-5'-P-CCNC: 13 U/L (ref 7–52)
ANION GAP SERPL CALCULATED.3IONS-SCNC: 6 MMOL/L (ref 4–13)
AST SERPL W P-5'-P-CCNC: 23 U/L (ref 13–39)
ATRIAL RATE: 69 BPM
BASOPHILS # BLD AUTO: 0.02 THOUSANDS/ÂΜL (ref 0–0.1)
BASOPHILS NFR BLD AUTO: 0 % (ref 0–1)
BILIRUB SERPL-MCNC: 0.69 MG/DL (ref 0.2–1)
BUN SERPL-MCNC: 11 MG/DL (ref 5–25)
CALCIUM SERPL-MCNC: 9.2 MG/DL (ref 8.4–10.2)
CARDIAC TROPONIN I PNL SERPL HS: 2 NG/L
CARDIAC TROPONIN I PNL SERPL HS: 2 NG/L
CARDIAC TROPONIN I PNL SERPL HS: <2 NG/L
CHLORIDE SERPL-SCNC: 103 MMOL/L (ref 96–108)
CO2 SERPL-SCNC: 26 MMOL/L (ref 21–32)
CREAT SERPL-MCNC: 0.73 MG/DL (ref 0.6–1.3)
EOSINOPHIL # BLD AUTO: 0.02 THOUSAND/ÂΜL (ref 0–0.61)
EOSINOPHIL NFR BLD AUTO: 0 % (ref 0–6)
ERYTHROCYTE [DISTWIDTH] IN BLOOD BY AUTOMATED COUNT: 12.4 % (ref 11.6–15.1)
GFR SERPL CREATININE-BSD FRML MDRD: 90 ML/MIN/1.73SQ M
GLUCOSE SERPL-MCNC: 103 MG/DL (ref 65–140)
HCT VFR BLD AUTO: 40 % (ref 34.8–46.1)
HGB BLD-MCNC: 13.2 G/DL (ref 11.5–15.4)
IMM GRANULOCYTES # BLD AUTO: 0.03 THOUSAND/UL (ref 0–0.2)
IMM GRANULOCYTES NFR BLD AUTO: 0 % (ref 0–2)
LYMPHOCYTES # BLD AUTO: 0.71 THOUSANDS/ÂΜL (ref 0.6–4.47)
LYMPHOCYTES NFR BLD AUTO: 11 % (ref 14–44)
MCH RBC QN AUTO: 31.7 PG (ref 26.8–34.3)
MCHC RBC AUTO-ENTMCNC: 33 G/DL (ref 31.4–37.4)
MCV RBC AUTO: 96 FL (ref 82–98)
MONOCYTES # BLD AUTO: 0.49 THOUSAND/ÂΜL (ref 0.17–1.22)
MONOCYTES NFR BLD AUTO: 7 % (ref 4–12)
NEUTROPHILS # BLD AUTO: 5.42 THOUSANDS/ÂΜL (ref 1.85–7.62)
NEUTS SEG NFR BLD AUTO: 82 % (ref 43–75)
NRBC BLD AUTO-RTO: 0 /100 WBCS
P AXIS: 50 DEGREES
PLATELET # BLD AUTO: 225 THOUSANDS/UL (ref 149–390)
PMV BLD AUTO: 8.6 FL (ref 8.9–12.7)
POTASSIUM SERPL-SCNC: 4 MMOL/L (ref 3.5–5.3)
PR INTERVAL: 174 MS
PROT SERPL-MCNC: 7.1 G/DL (ref 6.4–8.4)
QRS AXIS: -13 DEGREES
QRSD INTERVAL: 90 MS
QT INTERVAL: 400 MS
QTC INTERVAL: 428 MS
RBC # BLD AUTO: 4.16 MILLION/UL (ref 3.81–5.12)
SODIUM SERPL-SCNC: 135 MMOL/L (ref 135–147)
T WAVE AXIS: 51 DEGREES
VENTRICULAR RATE: 69 BPM
WBC # BLD AUTO: 6.69 THOUSAND/UL (ref 4.31–10.16)

## 2022-12-27 NOTE — ASSESSMENT & PLAN NOTE
History of colorectal surgery at age 23   Sees GI infrequently and last c-scope was told to follow up in 10 years

## 2022-12-27 NOTE — ASSESSMENT & PLAN NOTE
Unclear etiology though recently had a booster (3-4 weeks, Crisp Prom)   She denies cp/sob, n/v    · Telemetry  · Daily EKG  · Troponin delta 2 at 2 hours  · Consider holter monitor on discharge however does seem vasovagal in its symptoms and less related to cardiomyopathy

## 2022-12-27 NOTE — ED PROVIDER NOTES
History  Chief Complaint   Patient presents with   • Syncope     Pt reports "passing out"this am  Was washing face, woke up on floor denies blood thinners  Reports landed prone position  Patient is a 61-year-old female presenting after syncopal episode with head strike  Patient states she woke up normally this morning, use the bathroom, used a wipe for her face and without any preceding symptoms when she went to throw the wipe away she lost consciousness and woke up on the floor  Patient states she thinks she hit her head on the sink and currently has a headache with a bruise on her right eyebrow and on the posterior right side of her head  After event patient was having episodes of feeling hot and cold with nausea but no vomiting  Patient has not had any recent illnesses, changes in medications, changes in diet   states last night he did notice she had a weird breathing pattern where she would take a deep breath and hold for about 15 to 20 seconds and then breathe out taking another deep breath and holding it for 15 to 20 seconds  He said this lasted for a little while but then eventually she started breathing normally at which point he fell back asleep  Patient states yesterday she did get her COVID booster  Prior to Admission Medications   Prescriptions Last Dose Informant Patient Reported? Taking?    Lysine 1000 MG TABS 12/26/2022  Yes Yes   Sig: Take by mouth   OIL OF OREGANO PO 12/26/2022  Yes Yes   Sig: Take by mouth   Vitamin C-Vitamin D-Zinc (D3/VITAMIN C/ZINC PO) 12/26/2022  Yes Yes   Sig: Take by mouth   multivitamin [facility]) TABS 12/26/2022  Yes Yes   Sig: Take 1 tablet by mouth daily   vitamin E 100 UNIT capsule 12/26/2022  Yes Yes   Sig: Take 100 Units by mouth daily      Facility-Administered Medications: None       Past Medical History:   Diagnosis Date   • Seasonal allergies    • Skin cancer     foot       Past Surgical History:   Procedure Laterality Date   • COLON SURGERY      ruptured appendix and colon - 1 foot of colon removed   • LAMINECTOMY FOR IMPLANTATION / PLACEMENT NEUROSTIMULATOR ELECTRODES  2015    SJ3   • NE INSERTION/RPLCMT PERIPHERAL/GASTRIC NPGR Right 2/26/2019    Procedure: CHANGE OF THE SACRAL NEUROSTIMULATOR IMPLANT;  Surgeon: Janett Padilla MD;  Location: [facility];  Service: Colorectal       Family History   Problem Relation Age of Onset   • Heart attack Mother         fatal   • Diabetes Mother    • Colon cancer Father    • Prostate cancer Father 72   • Skin cancer Father    • No Known Problems Sister    • Cancer Maternal Grandmother         Gastric cancer   • No Known Problems Maternal Grandfather    • No Known Problems Paternal Grandmother    • Skin cancer Paternal Grandfather    • No Known Problems Maternal Aunt    • No Known Problems Maternal Aunt    • No Known Problems Maternal Aunt    • No Known Problems Paternal Aunt    • No Known Problems Paternal Aunt    • No Known Problems Paternal Aunt    • Breast cancer Neg Hx      I have reviewed and agree with the history as documented  E-Cigarette/Vaping   • E-Cigarette Use Never User      E-Cigarette/Vaping Substances     Social History     Tobacco Use   • Smoking status: Never   • Smokeless tobacco: Never   Vaping Use   • Vaping Use: Never used   Substance Use Topics   • Alcohol use: Yes     Comment: social   • Drug use: No        Review of Systems   Constitutional: Negative for activity change, appetite change and fever  HENT: Negative for congestion, ear pain, rhinorrhea and sore throat  Eyes: Negative for pain and visual disturbance  Respiratory: Negative for cough, shortness of breath, wheezing and stridor  Cardiovascular: Negative for chest pain and palpitations  Gastrointestinal: Positive for nausea  Negative for abdominal pain, constipation, diarrhea and vomiting  Genitourinary: Negative for dysuria, flank pain and hematuria     Musculoskeletal: Negative for arthralgias, back pain, neck pain and neck stiffness  Skin: Negative for color change and rash  Neurological: Positive for syncope and headaches  Negative for dizziness, seizures, facial asymmetry, speech difficulty, weakness, light-headedness and numbness  All other systems reviewed and are negative  Physical Exam  ED Triage Vitals   Temperature Pulse Respirations Blood Pressure SpO2   12/27/22 0933 12/27/22 0932 12/27/22 0932 12/27/22 0932 12/27/22 0932   98 5 °F (36 9 °C) 66 16 136/71 100 %      Temp Source Heart Rate Source Patient Position - Orthostatic VS BP Location FiO2 (%)   12/27/22 0933 12/27/22 1100 12/27/22 0932 12/27/22 0932 --   Oral Monitor Lying Right arm       Pain Score       12/27/22 1450       No Pain             Orthostatic Vital Signs  Vitals:    12/27/22 1100 12/27/22 1130 12/27/22 1230 12/27/22 1418   BP: 134/60 128/68 123/70 132/75   Pulse: 71 70 63 64   Patient Position - Orthostatic VS: Sitting Sitting Sitting Lying       Physical Exam  Vitals and nursing note reviewed  Constitutional:       General: She is not in acute distress  Appearance: She is well-developed  HENT:      Head: Normocephalic  Comments: Small bruise to R posterior scalp as well as R eye brow  Right Ear: Tympanic membrane, ear canal and external ear normal       Left Ear: Tympanic membrane, ear canal and external ear normal       Nose: Nose normal  No congestion or rhinorrhea  Mouth/Throat:      Mouth: Mucous membranes are moist       Pharynx: Oropharynx is clear  Eyes:      General: No scleral icterus  Extraocular Movements: Extraocular movements intact  Conjunctiva/sclera: Conjunctivae normal       Pupils: Pupils are equal, round, and reactive to light  Cardiovascular:      Rate and Rhythm: Normal rate and regular rhythm  Pulses: Normal pulses  Heart sounds: Normal heart sounds  No murmur heard  Pulmonary:      Effort: Pulmonary effort is normal  No respiratory distress  Breath sounds: Normal breath sounds  No stridor  No wheezing, rhonchi or rales  Chest:      Chest wall: No tenderness  Abdominal:      General: Abdomen is flat  Bowel sounds are normal       Palpations: Abdomen is soft  Tenderness: There is no abdominal tenderness  There is no right CVA tenderness or left CVA tenderness  Musculoskeletal:         General: Tenderness (Right knee) present  No deformity or signs of injury  Normal range of motion  Cervical back: Normal range of motion and neck supple  No rigidity or tenderness  Skin:     General: Skin is warm and dry  Findings: Bruising present  No lesion or rash  Neurological:      General: No focal deficit present  Mental Status: She is alert and oriented to person, place, and time  Cranial Nerves: No cranial nerve deficit  Sensory: No sensory deficit  Motor: No weakness           ED Medications  Medications - No data to display    Diagnostic Studies  Results Reviewed     Procedure Component Value Units Date/Time    HS Troponin I 4hr [[id number]]  (Normal) Collected: 12/27/22 1806    Lab Status: Final result Specimen: Blood from Arm, Left Updated: 12/27/22 1845     hs TnI 4hr <2 ng/L      Delta 4hr hsTnI <0 ng/L     HS Troponin I 2hr [[id number]]  (Normal) Collected: 12/27/22 1139    Lab Status: Final result Specimen: Blood from Arm, Right Updated: 12/27/22 1218     hs TnI 2hr 2 ng/L      Delta 2hr hsTnI 0 ng/L     HS Troponin 0hr (reflex protocol) [[id number]]  (Normal) Collected: 12/27/22 0942    Lab Status: Final result Specimen: Blood from Arm, Right Updated: 12/27/22 1016     hs TnI 0hr 2 ng/L     Comprehensive metabolic panel [[id number]] Collected: 12/27/22 0942    Lab Status: Final result Specimen: Blood from Arm, Right Updated: 12/27/22 1007     Sodium 135 mmol/L      Potassium 4 0 mmol/L      Chloride 103 mmol/L      CO2 26 mmol/L      ANION GAP 6 mmol/L      BUN 11 mg/dL      Creatinine 0 73 mg/dL      Glucose 103 mg/dL      Calcium 9 2 mg/dL      AST 23 U/L      ALT 13 U/L      Alkaline Phosphatase 47 U/L      Total Protein 7 1 g/dL      Albumin 4 5 g/dL      Total Bilirubin 0 69 mg/dL      eGFR 90 ml/min/1 73sq m     Narrative:      [place] guidelines for Chronic Kidney Disease (CKD):   •  Stage 1 with normal or high GFR (GFR > 90 mL/min/1 73 square meters)  •  Stage 2 Mild CKD (GFR = 60-89 mL/min/1 73 square meters)  •  Stage 3A Moderate CKD (GFR = 45-59 mL/min/1 73 square meters)  •  Stage 3B Moderate CKD (GFR = 30-44 mL/min/1 73 square meters)  •  Stage 4 Severe CKD (GFR = 15-29 mL/min/1 73 square meters)  •  Stage 5 End Stage CKD (GFR <15 mL/min/1 73 square meters)  Note: GFR calculation is accurate only with a steady state creatinine    CBC and differential [[id number]]  (Abnormal) Collected: 12/27/22 0942    Lab Status: Final result Specimen: Blood from Arm, Right Updated: 12/27/22 1000     WBC 6 69 Thousand/uL      RBC 4 16 Million/uL      Hemoglobin 13 2 g/dL      Hematocrit 40 0 %      MCV 96 fL      MCH 31 7 pg      MCHC 33 0 g/dL      RDW 12 4 %      MPV 8 6 fL      Platelets 369 Thousands/uL      nRBC 0 /100 WBCs      Neutrophils Relative 82 %      Immat GRANS % 0 %      Lymphocytes Relative 11 %      Monocytes Relative 7 %      Eosinophils Relative 0 %      Basophils Relative 0 %      Neutrophils Absolute 5 42 Thousands/µL      Immature Grans Absolute 0 03 Thousand/uL      Lymphocytes Absolute 0 71 Thousands/µL      Monocytes Absolute 0 49 Thousand/µL      Eosinophils Absolute 0 02 Thousand/µL      Basophils Absolute 0 02 Thousands/µL                  CT head without contrast   Final Result by Mai Pickard MD (12/27 1208)      No acute intracranial abnormality  Workstation performed: [id number]         XR chest 1 view portable   ED Interpretation by Jenise Tolbert MD (12/27 1116)   No acute cardiopulmonary disease        Final Result by Nilton Chase MD (12/27 1151)      No acute cardiopulmonary disease  Workstation performed: [id number]               Procedures  ECG 12 Lead Documentation Only    Date/Time: 12/27/2022 11:11 AM  Performed by: Jenise Tolbert MD  Authorized by: Jenise Tolbert MD     Patient location:  ED  Previous ECG:     Previous ECG:  Unavailable  Interpretation:     Interpretation: normal    Rate:     ECG rate:  69    ECG rate assessment: normal    Rhythm:     Rhythm: sinus rhythm    Ectopy:     Ectopy: none    QRS:     QRS axis:  Normal    QRS intervals:  Normal  Conduction:     Conduction: normal    ST segments:     ST segments:  Normal  T waves:     T waves: normal            ED Course             HEART Risk Score    Flowsheet Row Most Recent Value   Heart Score Risk Calculator    History 1 Filed at: 12/27/2022 1130   ECG 0 Filed at: 12/27/2022 1130   Age 1 Filed at: 12/27/2022 1130   Risk Factors 0 Filed at: 12/27/2022 1130   Troponin 0 Filed at: 12/27/2022 1130   HEART Score 2 Filed at: 12/27/2022 1130                                MDM  Number of Diagnoses or Management Options  Syncope  Traumatic injury of head, initial encounter  Diagnosis management comments: 20-year-old female presenting with syncopal event of unknown origin and no preceding symptoms  EKG-normal sinus  Trop-2  CXR- no acute cardiopulmonary disease    Labs unremarkable    Head CT-no acute intracranial abnormalities  History is concerning for arrhythmia with sudden onset with no prodrome  Pt discussed with SLIM and admitted to observation for cardiac monitoring                Disposition  Final diagnoses:   Syncope   Traumatic injury of head, initial encounter     Time reflects when diagnosis was documented in both MDM as applicable and the Disposition within this note     Time User Action Codes Description Comment    12/27/2022 12:31 PM Katerina Lux Add [R55] Syncope     12/27/2022 12:32 PM Katerina Lux Add [S09 90XA] Traumatic injury of head, initial encounter       ED Disposition     ED Disposition   Admit    Condition   Stable    Date/Time   Tue Dec 27, 2022 12:33 PM    Comment   Case was discussed with Dr Gustabo Castañeda and the patient's admission status was agreed to be Admission Status: observation status to the service of Dr Gustabo Castañeda   Follow-up Information    None         Current Discharge Medication List      CONTINUE these medications which have NOT CHANGED    Details   Lysine 1000 MG TABS Take by mouth      multivitamin (THERAGRAN) TABS Take 1 tablet by mouth daily      OIL OF OREGANO PO Take by mouth      Vitamin C-Vitamin D-Zinc (D3/VITAMIN C/ZINC PO) Take by mouth      vitamin E 100 UNIT capsule Take 100 Units by mouth daily           No discharge procedures on file  PDMP Review     None           ED Provider  Attending physically available and evaluated Markaidennicky Anastacia LAGUNAS managed the patient along with the ED Attending      Electronically Signed by         Marva Perla MD  12/27/22 2037

## 2022-12-27 NOTE — ED ATTENDING ATTESTATION
12/27/2022  IKarla MD, saw and evaluated the patient  I have discussed the patient with the resident/non-physician practitioner and agree with the resident's/non-physician practitioner's findings, Plan of Care, and MDM as documented in the resident's/non-physician practitioner's note, except where noted  All available labs and Radiology studies were reviewed  I was present for key portions of any procedure(s) performed by the resident/non-physician practitioner and I was immediately available to provide assistance  At this point I agree with the current assessment done in the Emergency Department  I have conducted an independent evaluation of this patient a history and physical is as follows:    77-year-old presenting with syncope  No preceding symptoms  Was standing and next thing she is on the ground  Denies any complaints at this time  No history of arrhythmias      Agree with cardiac work-up, without preceding symptoms concern for potential arrhythmia, will need observation on telemetry     ED Course         Critical Care Time  Procedures

## 2022-12-27 NOTE — H&P
Andrewskye 6 1965, 59 y o  female MRN: [id number]  Unit/Bed#: ED-17 Encounter: [id number]  Primary Care Provider: Franco Rodríguez DO   Date and time admitted to hospital: 12/27/2022  9:27 AM    Syncope  Assessment & Plan  Unclear etiology though recently had a booster (3-4 weeks, Moderna)  She denies cp/sob, n/v    · Telemetry  · Daily EKG  · Troponin delta 2 at 2 hours  · Consider holter monitor on discharge however does seem vasovagal in its symptoms and less related to cardiomyopathy  · Echo     Chronic ulcerative pancolitis (HCC)  Assessment & Plan  History of colorectal surgery at age 23  Sees GI infrequently and last c-scope was told to follow up in 10 years    VTE Pharmacologic Prophylaxis: VTE Score: 1 Low Risk (Score 0-2) - Encourage Ambulation  Code Status: full code  Discussion with family: Updated  () at bedside  Anticipated Length of Stay: Patient will be admitted on an observation basis with an anticipated length of stay of less than 2 midnights secondary to syncope  Total Time for Visit, including Counseling / Coordination of Care: 70 minutes Greater than 50% of this total time spent on direct patient counseling and coordination of care  Chief Complaint: syncope    History of Present Illness:  Armin Sagastume is a 59 y o  female with a PMH of pancolitis who presents with syncope  She notes that yesterday she had the COVID booster, 3 to 4 weeks after her first initial COVID-vaccine  She got out of bed to wash her face and the next thing she knew she was sitting on the floor next to her  and son  Her  reports hearing a loud thud and running upstairs  She does not recall striking her head though does have pain over her right eyebrow and right side of the back of her head  She reports feeling nauseous when she was aroused by her  and son    She then went to the kitchen to prepare some water bottles for the day and felt nauseous again and had to sit down  The nausea went away after she had some tea  She was then brought to the emergency room    She denies any prodromal chest pain, shortness of breath, lightheadedness, dizziness  She takes multivitamins and supplements daily and none of these are new  She denies taking any other medications for medical conditions  She is healthy and notes that she takes about 10-15,000 steps daily  She has never had anything like this before  Previous hospitalizations include an episode of pancolitis in her early 25s where "a foot of her colon "was removed  She was not diagnosed with ulcerative colitis that she knows of at the time  She did not require any maintenance medications  She is also diagnosed with pneumonia in her early 25s that required an overnight hospitalization  She lives at home with her  and son  She is retired  She received her second COVID booster yesterday (3-4 weeks)  She has a family history of her mother having a heart attack in her early 46s  She notes her younger brother also has heart disease  She notes they both have diabetes  Review of Systems:  Review of Systems   Constitutional: Negative for activity change, appetite change and fatigue  HENT: Negative for congestion and sinus pressure  Respiratory: Negative for apnea, chest tightness, shortness of breath and wheezing  Gastrointestinal: Negative for abdominal distention, abdominal pain, nausea and vomiting  Endocrine: Negative for cold intolerance and heat intolerance  Genitourinary: Negative for difficulty urinating and dysuria  Musculoskeletal: Negative for arthralgias  Skin: Negative for color change, rash and wound  Neurological: Negative for dizziness, numbness and headaches  Hematological: Negative for adenopathy  Does not bruise/bleed easily     Psychiatric/Behavioral: Negative for agitation, behavioral problems, confusion and sleep disturbance  Past Medical and Surgical History:   Past Medical History:   Diagnosis Date   • Seasonal allergies    • Skin cancer     foot       Past Surgical History:   Procedure Laterality Date   • COLON SURGERY      ruptured appendix and colon - 1 foot of colon removed   • LAMINECTOMY FOR IMPLANTATION / PLACEMENT NEUROSTIMULATOR ELECTRODES  2015    [facility]   • ME INSERTION/RPLCMT PERIPHERAL/GASTRIC NPGR Right 2/26/2019    Procedure: CHANGE OF THE SACRAL NEUROSTIMULATOR IMPLANT;  Surgeon: Bing Borja MD;  Location: [facility];  Service: Colorectal       Meds/Allergies:  Prior to Admission medications    Medication Sig Start Date End Date Taking? Authorizing Provider   Lysine 1000 MG TABS Take by mouth    Historical Provider, MD   multivitamin (THERAGRAN) TABS Take 1 tablet by mouth daily    Historical Provider, MD   OIL OF OREGANO PO Take by mouth    Historical Provider, MD   Vitamin C-Vitamin D-Zinc (D3/VITAMIN C/ZINC PO) Take by mouth    Historical Provider, MD   vitamin E 100 UNIT capsule Take 100 Units by mouth daily    Historical Provider, MD     I have reviewed home medications with patient personally  Allergies:    Allergies   Allergen Reactions   • Erythromycin Vomiting       Social History:  Marital Status: /Civil Union   Occupation: retired  Patient Pre-hospital Living Situation: Home  Patient Pre-hospital Level of Mobility: walks  Patient Pre-hospital Diet Restrictions: none  Substance Use History:   Social History     Substance and Sexual Activity   Alcohol Use Yes    Comment: social     Social History     Tobacco Use   Smoking Status Never   Smokeless Tobacco Never     Social History     Substance and Sexual Activity   Drug Use No       Family History:  Family History   Problem Relation Age of Onset   • Heart attack Mother         fatal   • Diabetes Mother    • Colon cancer Father    • Prostate cancer Father 72   • Skin cancer Father    • No Known Problems Sister    • Cancer Maternal Grandmother         Gastric cancer   • No Known Problems Maternal Grandfather    • No Known Problems Paternal Grandmother    • Skin cancer Paternal Grandfather    • No Known Problems Maternal Aunt    • No Known Problems Maternal Aunt    • No Known Problems Maternal Aunt    • No Known Problems Paternal Aunt    • No Known Problems Paternal Aunt    • No Known Problems Paternal Aunt    • Breast cancer Neg Hx        Physical Exam:     Vitals:   Blood Pressure: 123/70 (12/27/22 1230)  Pulse: 63 (12/27/22 1230)  Temperature: 98 5 °F (36 9 °C) (12/27/22 0933)  Temp Source: Oral (12/27/22 0933)  Respirations: 16 (12/27/22 1230)  Weight - Scale: 66 1 kg (145 lb 11 6 oz) (12/27/22 0932)  SpO2: 98 % (12/27/22 1230)    Physical Exam  Vitals and nursing note reviewed  Constitutional:       Appearance: Normal appearance  She is not ill-appearing or diaphoretic  HENT:      Head: Normocephalic  Nose: Nose normal  No congestion  Mouth/Throat:      Mouth: Mucous membranes are moist       Pharynx: No oropharyngeal exudate  Eyes:      General: No scleral icterus  Conjunctiva/sclera: Conjunctivae normal    Cardiovascular:      Rate and Rhythm: Normal rate and regular rhythm  Pulses: Normal pulses  Heart sounds: No murmur heard  Pulmonary:      Effort: Pulmonary effort is normal  No respiratory distress  Breath sounds: No wheezing or rales  Abdominal:      General: Bowel sounds are normal  There is no distension  Palpations: Abdomen is soft  Tenderness: There is no abdominal tenderness  Musculoskeletal:         General: No tenderness  Normal range of motion  Cervical back: Normal range of motion and neck supple  No rigidity  Right lower leg: No edema  Left lower leg: No edema  Skin:     General: Skin is warm  Coloration: Skin is not jaundiced  Neurological:      Mental Status: She is alert and oriented to person, place, and time     Psychiatric:         Mood and Affect: Mood normal          Behavior: Behavior normal           Additional Data:     Lab Results:  Results from last 7 days   Lab Units 12/27/22  0942   WBC Thousand/uL 6 69   HEMOGLOBIN g/dL 13 2   HEMATOCRIT % 40 0   PLATELETS Thousands/uL 225   NEUTROS PCT % 82*   LYMPHS PCT % 11*   MONOS PCT % 7   EOS PCT % 0     Results from last 7 days   Lab Units 12/27/22  0942   SODIUM mmol/L 135   POTASSIUM mmol/L 4 0   CHLORIDE mmol/L 103   CO2 mmol/L 26   BUN mg/dL 11   CREATININE mg/dL 0 73   ANION GAP mmol/L 6   CALCIUM mg/dL 9 2   ALBUMIN g/dL 4 5   TOTAL BILIRUBIN mg/dL 0 69   ALK PHOS U/L 47   ALT U/L 13   AST U/L 23   GLUCOSE RANDOM mg/dL 103                       Lines/Drains:  Invasive Devices     Peripheral Intravenous Line  Duration           Peripheral IV 12/27/22 Proximal;Right;Ventral (anterior) Antecubital <1 day                    Imaging: Personally reviewed the following imaging: CT head  CT head without contrast   Final Result by Chu Marshall MD (12/27 1208)      No acute intracranial abnormality  Workstation performed: [id number]         XR chest 1 view portable   ED Interpretation by Julio C Evans MD (12/27 1116)   No acute cardiopulmonary disease  Final Result by Tanya Eisenmenger, MD (12/27 1151)      No acute cardiopulmonary disease  Workstation performed: [id number]             EKG and Other Studies Reviewed on Admission:   · EKG: NSR  HR 70     ** Please Note: This note has been constructed using a voice recognition system   **

## 2022-12-27 NOTE — PLAN OF CARE
Problem: CARDIOVASCULAR - ADULT  Goal: Maintains optimal cardiac output and hemodynamic stability  Description: INTERVENTIONS:  - Monitor I/O, vital signs and rhythm  - Monitor for S/S and trends of decreased cardiac output  - Administer and titrate ordered vasoactive medications to optimize hemodynamic stability  - Assess quality of pulses, skin color and temperature  - Assess for signs of decreased coronary artery perfusion  - Instruct patient to report change in severity of symptoms  Outcome: Progressing  Goal: Absence of cardiac dysrhythmias or at baseline rhythm  Description: INTERVENTIONS:  - Continuous cardiac monitoring, vital signs, obtain 12 lead EKG if ordered  - Administer antiarrhythmic and heart rate control medications as ordered  - Monitor electrolytes and administer replacement therapy as ordered  Outcome: Progressing     Problem: METABOLIC, FLUID AND ELECTROLYTES - ADULT  Goal: Electrolytes maintained within normal limits  Description: INTERVENTIONS:  - Monitor labs and assess patient for signs and symptoms of electrolyte imbalances  - Administer electrolyte replacement as ordered  - Monitor response to electrolyte replacements, including repeat lab results as appropriate  - Instruct patient on fluid and nutrition as appropriate  Outcome: Progressing  Goal: Fluid balance maintained  Description: INTERVENTIONS:  - Monitor labs   - Monitor I/O and WT  - Instruct patient on fluid and nutrition as appropriate  - Assess for signs & symptoms of volume excess or deficit  Outcome: Progressing  Goal: Glucose maintained within target range  Description: INTERVENTIONS:  - Monitor Blood Glucose as ordered  - Assess for signs and symptoms of hyperglycemia and hypoglycemia  - Administer ordered medications to maintain glucose within target range  - Assess nutritional intake and initiate nutrition service referral as needed  Outcome: Progressing     Problem: MUSCULOSKELETAL - ADULT  Goal: Maintain or return mobility to safest level of function  Description: INTERVENTIONS:  - Assess patient's ability to carry out ADLs; assess patient's baseline for ADL function and identify physical deficits which impact ability to perform ADLs (bathing, care of mouth/teeth, toileting, grooming, dressing, etc )  - Assess/evaluate cause of self-care deficits   - Assess range of motion  - Assess patient's mobility  - Assess patient's need for assistive devices and provide as appropriate  - Encourage maximum independence but intervene and supervise when necessary  - Involve family in performance of ADLs  - Assess for home care needs following discharge   - Consider OT consult to assist with ADL evaluation and planning for discharge  - Provide patient education as appropriate  Outcome: Progressing  Goal: Maintain proper alignment of affected body part  Description: INTERVENTIONS:  - Support, maintain and protect limb and body alignment  - Provide patient/ family with appropriate education  Outcome: Progressing

## 2022-12-28 ENCOUNTER — APPOINTMENT (OUTPATIENT)
Dept: NON INVASIVE DIAGNOSTICS | Facility: HOSPITAL | Age: 57
End: 2022-12-28

## 2022-12-28 VITALS
HEIGHT: 65 IN | HEART RATE: 64 BPM | OXYGEN SATURATION: 97 % | TEMPERATURE: 98 F | RESPIRATION RATE: 16 BRPM | DIASTOLIC BLOOD PRESSURE: 76 MMHG | SYSTOLIC BLOOD PRESSURE: 117 MMHG | BODY MASS INDEX: 24.16 KG/M2 | WEIGHT: 145 LBS

## 2022-12-28 LAB
ANION GAP SERPL CALCULATED.3IONS-SCNC: 7 MMOL/L (ref 4–13)
AORTIC ROOT: 3.2 CM
APICAL FOUR CHAMBER EJECTION FRACTION: 66 %
ASCENDING AORTA: 2.9 CM
BASOPHILS # BLD AUTO: 0.01 THOUSANDS/ÂΜL (ref 0–0.1)
BASOPHILS NFR BLD AUTO: 0 % (ref 0–1)
BUN SERPL-MCNC: 13 MG/DL (ref 5–25)
CALCIUM SERPL-MCNC: 9.1 MG/DL (ref 8.4–10.2)
CHLORIDE SERPL-SCNC: 106 MMOL/L (ref 96–108)
CO2 SERPL-SCNC: 25 MMOL/L (ref 21–32)
CREAT SERPL-MCNC: 0.6 MG/DL (ref 0.6–1.3)
E WAVE DECELERATION TIME: 211 MS
EOSINOPHIL # BLD AUTO: 0.07 THOUSAND/ÂΜL (ref 0–0.61)
EOSINOPHIL NFR BLD AUTO: 2 % (ref 0–6)
ERYTHROCYTE [DISTWIDTH] IN BLOOD BY AUTOMATED COUNT: 12.2 % (ref 11.6–15.1)
FRACTIONAL SHORTENING: 33 % (ref 28–44)
GFR SERPL CREATININE-BSD FRML MDRD: 100 ML/MIN/1.73SQ M
GLUCOSE P FAST SERPL-MCNC: 100 MG/DL (ref 65–99)
GLUCOSE SERPL-MCNC: 100 MG/DL (ref 65–140)
HCT VFR BLD AUTO: 39.3 % (ref 34.8–46.1)
HGB BLD-MCNC: 12.9 G/DL (ref 11.5–15.4)
IMM GRANULOCYTES # BLD AUTO: 0.01 THOUSAND/UL (ref 0–0.2)
IMM GRANULOCYTES NFR BLD AUTO: 0 % (ref 0–2)
INTERVENTRICULAR SEPTUM IN DIASTOLE (PARASTERNAL SHORT AXIS VIEW): 0.7 CM
INTERVENTRICULAR SEPTUM: 0.7 CM (ref 0.6–1.1)
LAAS-AP2: 19.5 CM2
LAAS-AP4: 13.3 CM2
LEFT ATRIUM SIZE: 3.3 CM
LEFT INTERNAL DIMENSION IN SYSTOLE: 3.2 CM (ref 2.1–4)
LEFT VENTRICULAR INTERNAL DIMENSION IN DIASTOLE: 4.8 CM (ref 3.5–6)
LEFT VENTRICULAR POSTERIOR WALL IN END DIASTOLE: 0.7 CM
LEFT VENTRICULAR STROKE VOLUME: 66 ML
LVSV (TEICH): 66 ML
LYMPHOCYTES # BLD AUTO: 0.83 THOUSANDS/ÂΜL (ref 0.6–4.47)
LYMPHOCYTES NFR BLD AUTO: 23 % (ref 14–44)
MCH RBC QN AUTO: 31.8 PG (ref 26.8–34.3)
MCHC RBC AUTO-ENTMCNC: 32.8 G/DL (ref 31.4–37.4)
MCV RBC AUTO: 97 FL (ref 82–98)
MONOCYTES # BLD AUTO: 0.45 THOUSAND/ÂΜL (ref 0.17–1.22)
MONOCYTES NFR BLD AUTO: 12 % (ref 4–12)
MV E'TISSUE VEL-SEP: 9 CM/S
MV PEAK A VEL: 0.64 M/S
MV PEAK E VEL: 72 CM/S
MV STENOSIS PRESSURE HALF TIME: 61 MS
MV VALVE AREA P 1/2 METHOD: 3.61 CM2
NEUTROPHILS # BLD AUTO: 2.3 THOUSANDS/ÂΜL (ref 1.85–7.62)
NEUTS SEG NFR BLD AUTO: 63 % (ref 43–75)
NRBC BLD AUTO-RTO: 0 /100 WBCS
PLATELET # BLD AUTO: 205 THOUSANDS/UL (ref 149–390)
PMV BLD AUTO: 8.7 FL (ref 8.9–12.7)
POTASSIUM SERPL-SCNC: 4.3 MMOL/L (ref 3.5–5.3)
RBC # BLD AUTO: 4.06 MILLION/UL (ref 3.81–5.12)
RIGHT ATRIUM AREA SYSTOLE A4C: 10.5 CM2
RIGHT VENTRICLE ID DIMENSION: 3.4 CM
SL CV LEFT ATRIUM LENGTH A2C: 5.9 CM
SL CV LV EF: 55
SL CV PED ECHO LEFT VENTRICLE DIASTOLIC VOLUME (MOD BIPLANE) 2D: 106 ML
SL CV PED ECHO LEFT VENTRICLE SYSTOLIC VOLUME (MOD BIPLANE) 2D: 40 ML
SODIUM SERPL-SCNC: 138 MMOL/L (ref 135–147)
TR MAX PG: 19 MMHG
TR PEAK VELOCITY: 2.2 M/S
TRICUSPID VALVE PEAK REGURGITATION VELOCITY: 2.17 M/S
WBC # BLD AUTO: 3.67 THOUSAND/UL (ref 4.31–10.16)

## 2022-12-28 NOTE — UTILIZATION REVIEW
Initial Clinical Review    Admission: Date/Time/Statement:   Admission Orders (From admission, onward)     Ordered        12/27/22 1234  Place in Observation  Once                      Orders Placed This Encounter   Procedures   • Place in Observation     Standing Status:   Standing     Number of Occurrences:   1     Order Specific Question:   Level of Care     Answer:   Med Surg [16]     ED Arrival Information     Expected   -    Arrival   12/27/2022 09:20    Acuity   Urgent            Means of arrival   Walk-In    Escorted by   Self    Service   Hospitalist    Admission type   Emergency            Arrival complaint   Syncope/LOC           Chief Complaint   Patient presents with   • Syncope     Pt reports "passing out"this am  Was washing face, woke up on floor denies blood thinners  Reports landed prone position          Initial Presentation: 59 y o  female to ED via walk in from home   Admitted to OBS for Syncope  Presents after syncopal episode with head strike; c/o headache; Yesterday got her covid booster and rec'd 1st initial covid vaccine 3-4 weeks ago; on exam nausea and headache  PMHX: no significant  PLAN: Tele; Daily EKG; monitor trops; consider Holter monitor upon d/c; ECHO pending        Date:    Day 2:     ED Triage Vitals   Temperature Pulse Respirations Blood Pressure SpO2   12/27/22 0933 12/27/22 0932 12/27/22 0932 12/27/22 0932 12/27/22 0932   98 5 °F (36 9 °C) 66 16 136/71 100 %      Temp Source Heart Rate Source Patient Position - Orthostatic VS BP Location FiO2 (%)   12/27/22 0933 12/27/22 1100 12/27/22 0932 12/27/22 0932 --   Oral Monitor Lying Right arm       Pain Score       12/27/22 1450       No Pain          Wt Readings from Last 1 Encounters:   12/27/22 66 1 kg (145 lb 11 6 oz)     Additional Vital Signs:   Date/Time Temp Pulse Resp BP MAP (mmHg) SpO2 O2 Device Patient Position - Orthostatic VS              12/27/22 22:03:45 99 °F (37 2 °C) 64 -- 107/68 81 97 % -- --   12/27/22 14:18:27 99 °F (37 2 °C) 64 17 132/75 94 97 % None (Room air) Lying   12/27/22 1230 -- 63 16 123/70 91 98 % None (Room air) Sitting   12/27/22 1130 -- 70 16 128/68 93 95 % None (Room air) Sitting   12/27/22 1100 -- 71 16 134/60 87 99 % None (Room air) Sitting   12/27/22 0933 98 5 °F (36 9 °C) -- -- -- -- -- -- --   12/27/22 0932 -- 66 16 136/71 -- 100 % None (Room air) Lying             EKG: Normal sinus rhythm  Normal ECG  No previous ECGs available      Pertinent Labs/Diagnostic Test Results:   CT head without contrast   Final Result by Elias Kinsey MD (12/27 1208)      No acute intracranial abnormality  Workstation performed: [id number]         XR chest 1 view portable   ED Interpretation by Lennox Mason, MD (12/27 1116)   No acute cardiopulmonary disease  Final Result by Deng Cazares MD (12/27 1151)      No acute cardiopulmonary disease                    Workstation performed: [id number]               Results from last 7 days   Lab Units 12/28/22  0457 12/27/22  0942   WBC Thousand/uL 3 67* 6 69   HEMOGLOBIN g/dL 12 9 13 2   HEMATOCRIT % 39 3 40 0   PLATELETS Thousands/uL 205 225   NEUTROS ABS Thousands/µL 2 30 5 42         Results from last 7 days   Lab Units 12/28/22  0457 12/27/22  0942   SODIUM mmol/L 138 135   POTASSIUM mmol/L 4 3 4 0   CHLORIDE mmol/L 106 103   CO2 mmol/L 25 26   ANION GAP mmol/L 7 6   BUN mg/dL 13 11   CREATININE mg/dL 0 60 0 73   EGFR ml/min/1 73sq m 100 90   CALCIUM mg/dL 9 1 9 2     Results from last 7 days   Lab Units 12/27/22  0942   AST U/L 23   ALT U/L 13   ALK PHOS U/L 47   TOTAL PROTEIN g/dL 7 1   ALBUMIN g/dL 4 5   TOTAL BILIRUBIN mg/dL 0 69         Results from last 7 days   Lab Units 12/28/22  0457 12/27/22  0942   GLUCOSE RANDOM mg/dL 100 103         Results from last 7 days   Lab Units 12/27/22  1806 12/27/22  1139 12/27/22  0942   HS TNI 0HR ng/L  --   --  2   HS TNI 2HR ng/L  --  2  --    HSTNI D2 ng/L  --  0  --    HS TNI 4HR ng/L <2  --   -- HSTNI D4 ng/L <0  --   --        ED Treatment:   Medication Administration from 12/27/2022 0919 to 12/27/2022 1417     None        Past Medical History:   Diagnosis Date   • Seasonal allergies    • Skin cancer     foot     Present on Admission:  • Chronic ulcerative pancolitis (HCC)      Admitting Diagnosis: Syncope [R55]  Traumatic injury of head, initial encounter [S09 90XA]  Age/Sex: 59 y o  female  Admission Orders:  Scheduled Medications:  No current facility-administered medications for this encounter  Continuous IV Infusions:  No current facility-administered medications for this encounter  PRN Meds:  No current facility-administered medications for this encounter  Network Utilization Review Department  ATTENTION: Please call with any questions or concerns to [phone] and carefully listen to the prompts so that you are directed to the right person  All voicemails are confidential   Marilee Donald all requests for admission clinical reviews, approved or denied determinations and any other requests to dedicated fax number below belonging to the campus where the patient is receiving treatment   List of dedicated fax numbers for the Facilities:  [address] DENIALS (Administrative/Medical Necessity) [phone]   [address] (Maternity/NICU/Pediatrics) [phone]   7 Darcy Maldonado [phone]   [place] [address] [phone]   [address] [place] [id number] [place] [address] Esvin Wells [address] [phone]   [facility] [place] [facility] [facility] 134 815 [facility] [phone]

## 2022-12-28 NOTE — DISCHARGE SUMMARY
[facility]  Discharge- Lisandra Mikhail 1965, 59 y o  female MRN: [id number]  Unit/Bed#: S -01 Encounter: [id number]  Primary Care Provider: Grant Bhakta DO   Date and time admitted to hospital: 12/27/2022  9:27 AM    * Syncope  Assessment & Plan  Unclear etiology though recently had a booster (3-4 weeks, [employer])  She denies cp/sob, n/v    · Telemetry showing normal sinus rhythm, heart rate in the 60s  · Troponin unremarkable  · Echo performed on 12/28/22 demonstrates LVEF of 55% and no abnormalities  · Orthostatic vital signs without signs of orthostatic hypotension  · Suspect syncope likely vasovagal with low suspicion for cardiomyopathy given echo results  · PCP follow-up    Chronic ulcerative pancolitis ([address] )  Assessment & Plan  History of colorectal surgery at age 23  Sees GI infrequently and last c-scope was told to follow up in 10 years  PCP follow-up      Medical Problems     Resolved Problems  Date Reviewed: 12/15/2022   None       Discharging Resident: Chris Condon MD  Discharging Attending: Mayra Florian MD  PCP: Grant Bhakta DO  Admission Date:   Admission Orders (From admission, onward)     Ordered        12/27/22 1234  Place in Observation  Once                      Discharge Date: 12/28/22    Consultations During Hospital Stay:  · None    Procedures Performed:   · None    Significant Findings / Test Results:   XR chest 1 view portable    Result Date: 12/27/2022  Impression: No acute cardiopulmonary disease  Workstation performed: [id number]     CT head without contrast    Result Date: 12/27/2022  Impression: No acute intracranial abnormality  Workstation performed: [id number]       XR chest 1 view portable    Result Date: 12/27/2022  Impression No acute cardiopulmonary disease   Workstation performed: [id number]   ·    Results from last 7 days   Lab Units 12/28/22  0457 12/27/22  0942   SODIUM mmol/L 138 135   POTASSIUM mmol/L 4 3 4 0   CHLORIDE mmol/L 106 103   CO2 mmol/L 25 26   ANION GAP mmol/L 7 6   BUN mg/dL 13 11   CREATININE mg/dL 0 60 0 73   CALCIUM mg/dL 9 1 9 2   ALK PHOS U/L  --  47   ALT U/L  --  13   AST U/L  --  23     Recent Labs     12/27/22  0942 12/28/22  0457   WBC 6 69 3 67*   HGB 13 2 12 9   HCT 40 0 39 3   MCV 96 97   MCH 31 7 31 8   MCHC 33 0 32 8    205   RBC 4 16 4 06     Lab Results   Component Value Date    HSTNI0 2 12/27/2022    HSTNI2 2 12/27/2022    HSTNID2 0 12/27/2022     ·     Incidental Findings:     · None    Test Results Pending at Discharge (will require follow up): · None     Outpatient Tests Requested:  · None    Complications:  None    Reason for Admission: Syncope    Hospital Course:   Ada Cooper is a 59 y o  female patient who originally presented to the hospital on 12/27/2022 due to a syncopal episode with associated post-syncopal nausea  Patient did not recall any symptoms prior to the episode and denied dizziness, lightheadedness, chest pain, palpitations or shortness of breath  On arrival to the ED, patient was back at baseline, with normal vital signs and physical exam   EKG showed normal sinus rhythm and labs were unremarkable, including troponins  Chest x-ray showed no acute cardiopulmonary disease and CT head without contrast showed no acute intracranial abnormality  Patient was admitted to the [facility] service for monitoring and further management  Orthostatic vitals were noted to be unremarkable during this admission, and transthoracic echo demonstrated normal findings  Given return to baseline and unremarkable workup, patient was discharged to home on 12/28/22  She was advised to continue maintaining proper hydration and follow up with her PCP within a week of discharge  Please see above list of diagnoses and related plan for additional information       Condition at Discharge: good    Discharge Day Visit / Exam:   Subjective:  Patient found sitting in bed, not in acute distress at the time of my evaluation  She has no complaints at this time and denies dizziness, lightheadedness, palpitations, chest pain, SoB or nausea  She states that she is at baseline and has been ambulating in the room without issues  Vitals: Blood Pressure: 117/76 (12/28/22 1105)  Pulse: 64 (12/28/22 1105)  Temperature: 98 °F (36 7 °C) (12/28/22 0753)  Temp Source: Oral (12/28/22 0753)  Respirations: 16 (12/28/22 0753)  Height: 5' 5" (165 1 cm) (12/28/22 1105)  Weight - Scale: 65 8 kg (145 lb) (12/28/22 1105)  SpO2: 97 % (12/28/22 1019)  Exam:   Physical Exam  Vitals and nursing note reviewed  Constitutional:       General: She is not in acute distress  Appearance: Normal appearance  She is not ill-appearing, toxic-appearing or diaphoretic  HENT:      Head: Normocephalic and atraumatic  Nose: Nose normal       Mouth/Throat:      Mouth: Mucous membranes are moist       Pharynx: Oropharynx is clear  Eyes:      General: No scleral icterus  Right eye: No discharge  Left eye: No discharge  Extraocular Movements: Extraocular movements intact  Conjunctiva/sclera: Conjunctivae normal    Cardiovascular:      Rate and Rhythm: Normal rate and regular rhythm  Pulses: Normal pulses  Heart sounds: Normal heart sounds  No murmur heard  Pulmonary:      Effort: Pulmonary effort is normal  No respiratory distress  Breath sounds: Normal breath sounds  No wheezing, rhonchi or rales  Abdominal:      General: Abdomen is flat  Bowel sounds are normal  There is no distension  Palpations: Abdomen is soft  Tenderness: There is no abdominal tenderness  There is no guarding or rebound  Musculoskeletal:      Cervical back: Normal range of motion and neck supple  Right lower leg: No edema  Left lower leg: No edema  Skin:     General: Skin is warm and dry  Coloration: Skin is not jaundiced or pale     Neurological:      Mental Status: She is alert and oriented to person, place, and time  Mental status is at baseline  Psychiatric:         Mood and Affect: Mood normal          Behavior: Behavior normal          Thought Content: Thought content normal          Judgment: Judgment normal           Discussion with Family: Updated  () at bedside  Discharge instructions/Information to patient and family:   See after visit summary for information provided to patient and family  Provisions for Follow-Up Care:  See after visit summary for information related to follow-up care and any pertinent home health orders  Disposition:   Home    Planned Readmission: None    Discharge Medications:  See after visit summary for reconciled discharge medications provided to patient and/or family        **Please Note: This note may have been constructed using a voice recognition system**

## 2022-12-28 NOTE — ASSESSMENT & PLAN NOTE
Unclear etiology though recently had a booster (3-4 weeks, [employer] Ceron)  She denies cp/sob, n/v    · Telemetry showing normal sinus rhythm, heart rate in the 60s  · Troponin unremarkable  · Echo performed on 12/28/22 demonstrates LVEF of 55% and no abnormalities  · Orthostatic vital signs without signs of orthostatic hypotension  · Suspect syncope likely vasovagal with low suspicion for cardiomyopathy given echo results    · PCP follow-up

## 2022-12-28 NOTE — DISCHARGE INSTR - AVS FIRST PAGE
Dear Devika Sandoval,     It was our pleasure to care for you here at [facility]  It is our hope that we were always able to exceed the expected standards for your care during your stay  You were hospitalized due to syncope  You were cared for on the [facility] 3rd floor by Shelly Alonso MD under the service of Corey Roberts MD with the [facility] who covers for your primary care physician (PCP), Bipin Grubbs DO, while you were hospitalized  If you have any questions or concerns related to this hospitalization, you may contact us at [phone]  For follow up as well as any medication refills, we recommend that you follow up with your primary care physician  A registered nurse will reach out to you by phone within a few days after your discharge to answer any additional questions that you may have after going home  However, at this time we provide for you here, the most important instructions / recommendations at discharge:     Notable Medication Adjustments -   Please continue taking your medications as prior to admission  Testing Required after Discharge -   None  Important follow up information -   Please follow up with your PCP within a week of discharge  Other Instructions -   Please contact your primary care provider, call 911 or go to the nearest emergency department for worsening symptoms  Practice Social distancing  Please practie adequate hand washing techinque  Wear a mask in public at all times    Please review this entire after visit summary as additional general instructions including medication list, appointments, activity, diet, any pertinent wound care, and other additional recommendations from your care team that may be provided for you        Sincerely,     Shelly Alonso MD

## 2022-12-28 NOTE — ASSESSMENT & PLAN NOTE
History of colorectal surgery at age 23   Sees GI infrequently and last c-scope was told to follow up in 10 years  PCP follow-up

## 2023-07-23 ENCOUNTER — NURSE TRIAGE (OUTPATIENT)
Dept: OTHER | Facility: OTHER | Age: 58
End: 2023-07-23

## 2023-07-23 NOTE — TELEPHONE ENCOUNTER
Reason for Disposition  • Blood in urine (red, pink, or tea-colored)    Answer Assessment - Initial Assessment Questions  1. SEVERITY: "How bad is the pain?"  (e.g., Scale 1-10; mild, moderate, or severe)    - MILD (1-3): complains slightly about urination hurting    - MODERATE (4-7): interferes with normal activities      - SEVERE (8-10): excruciating, unwilling or unable to urinate because of the pain       4/10  2. FREQUENCY: "How many times have you had painful urination today?"       Every time  3. PATTERN: "Is pain present every time you urinate or just sometimes?"       Everytime   4. ONSET: "When did the painful urination start?"       This am   5. FEVER: "Do you have a fever?" If Yes, ask: "What is your temperature, how was it measured, and when did it start?"      No   6. PAST UTI: "Have you had a urine infection before?" If Yes, ask: "When was the last time?" and "What happened that time?"       Many years ago  7. CAUSE: "What do you think is causing the painful urination?"  (e.g., UTI, scratch, Herpes sore)      UTI  8. OTHER SYMPTOMS: "Do you have any other symptoms?" (e.g., flank pain, vaginal discharge, genital sores, urgency, blood in urine)      Blood in urine  9.  PREGNANCY: "Is there any chance you are pregnant?" "When was your last menstrual period?"      No    Protocols used: URINATION PAIN [facility]

## 2023-07-23 NOTE — TELEPHONE ENCOUNTER
Regarding: UtI / blood in urine  ----- Message from Vladimir Rivera sent at 7/23/2023  1:46 PM EDT -----  "I have a UTI.  I have blood in my urine, discomfort and only urinating a little at a time"

## 2024-01-08 NOTE — PROGRESS NOTES
Assessment/Plan   Problem List Items Addressed This Visit    None  Visit Diagnoses       Well woman exam    -  Primary            Discussion  I have discussed the importance of monthly self-breast exams, exercise and healthy diet as well as adequate intake of calcium and vitamin D. Encourage at least 1200 mg calcium citrate + 2000 IUs vitamin D3 divided through diet and supplement throughout the day.    STI testing - declines    The current ASCCP guidelines were reviewed. Patient's last pap was 2021 and therefore, a pap with HPV cotesting is not indicated at this time. No history of abnormal paps.     Last mammogram: 2022 BIRADS1. Next one is scheduled.     Colorectal screening is UTD; 2020.      All questions have been answered to her satisfaction  RTO for APE or sooner if needed      Subjective     HPI   Galina Liriano is a 58 y.o. female who presents for annual well woman exam.     ; Denies  bleeding    No vulvar itch/burn; No vaginal itch/burn; No abn discharge or odor;     No urinary sx - burning/pain/frequency/hematuria    (+) SBEs - no breast masses, asymmetry, nipple discharge or bleeding, changes in skin of breast, or breast tenderness bilaterally. +FMH of breast CA in sister.    No abd/pelvic pain or HAs;     +occasional night sweats; tolerable.     Pt is sexually active in a mutually monog/ sexual relationship; No issues with intercourse; She declines sti/hiv/hep testing; Feels safe at home      (+) PCP for routine Bw/care;      Review of Systems   Constitutional:  Negative for fatigue.   Eyes:  Negative for photophobia and visual disturbance.   Respiratory:  Negative for cough and shortness of breath.    Cardiovascular:  Negative for chest pain and palpitations.   Gastrointestinal:  Negative for abdominal pain, blood in stool, constipation, diarrhea, nausea and rectal pain.   Genitourinary:  Negative for dyspareunia, dysuria, flank pain, frequency, genital sores, menstrual problem, pelvic  pain, urgency, vaginal bleeding, vaginal discharge and vaginal pain.   Musculoskeletal:  Negative for arthralgias and back pain.   Skin:  Negative for rash.   Neurological:  Negative for weakness and headaches.       The following portions of the patient's history were reviewed and updated as appropriate: allergies, current medications, past family history, past medical history, past social history, past surgical history, and problem list.         OB History          2    Para   1    Term   1            AB   1    Living   1         SAB        IAB        Ectopic        Multiple        Live Births   1           Obstetric Comments   :   M ; 1 VIP               Past Medical History:   Diagnosis Date    Seasonal allergies     Skin cancer     foot       Past Surgical History:   Procedure Laterality Date    COLON SURGERY      ruptured appendix and colon - 1 foot of colon removed    LAMINECTOMY FOR IMPLANTATION / PLACEMENT NEUROSTIMULATOR ELECTRODES      SJ3    [place] INS/RPLC PERPH SAC/GSTRC NPG/RCVR PCKT CRTJ&CONN Right 2019    Procedure: CHANGE OF THE SACRAL NEUROSTIMULATOR IMPLANT;  Surgeon: Shala Griffith MD;  [facility];  Service: Colorectal       Family History   Problem Relation Age of Onset    Heart attack Mother 52        fatal    Diabetes Mother     Colon cancer Father 65    Prostate cancer Father 65    Skin cancer Father     No Known Problems Sister     Cancer Maternal Grandmother         Gastric cancer    No Known Problems Maternal Grandfather     No Known Problems Paternal Grandmother     Skin cancer Paternal Grandfather     No Known Problems Maternal Aunt     No Known Problems Maternal Aunt     No Known Problems Maternal Aunt     No Known Problems Paternal Aunt     No Known Problems Paternal Aunt     No Known Problems Paternal Aunt     Breast cancer Neg Hx        Social History     Socioeconomic History    Marital status: /Civil Union     Spouse name: Not on  "file    Number of children: Not on file    Years of education: Not on file    Highest education level: Not on file   Occupational History    Not on file   Tobacco Use    Smoking status: Never    Smokeless tobacco: Never   Vaping Use    Vaping status: Never Used   Substance and Sexual Activity    Alcohol use: Yes     Comment: social    Drug use: No    Sexual activity: Yes     Partners: Male     Birth control/protection: Post-menopausal   Other Topics Concern    Not on file   Social History Narrative    Not on file     Social Determinants of Health     Financial Resource Strain: Not on file   Food Insecurity: Not on file   Transportation Needs: Not on file   Physical Activity: Not on file   Stress: Not on file   Social Connections: Not on file   Intimate Partner Violence: Not on file   Housing Stability: Not on file         Current Outpatient Medications:     Lysine 1000 MG TABS, Take by mouth, Disp: , Rfl:     multivitamin (THERAGRAN) TABS, Take 1 tablet by mouth daily, Disp: , Rfl:     OIL OF OREGANO PO, Take by mouth, Disp: , Rfl:     Vitamin C-Vitamin D-Zinc (D3/VITAMIN C/ZINC PO), Take by mouth, Disp: , Rfl:     vitamin E 100 UNIT capsule, Take 100 Units by mouth daily, Disp: , Rfl:     Allergies   Allergen Reactions    Erythromycin Vomiting       Objective   Vitals:    01/11/24 1127   BP: 128/72   BP Location: Left arm   Patient Position: Sitting   Cuff Size: Standard   Weight: 68.3 kg (150 lb 9.6 oz)   Height: 5' 5\" (1.651 m)     Physical Exam  Vitals and nursing note reviewed.   Constitutional:       Appearance: Normal appearance. She is well-developed and normal weight.   HENT:      Head: Normocephalic and atraumatic.   Eyes:      Conjunctiva/sclera: Conjunctivae normal.   Cardiovascular:      Rate and Rhythm: Normal rate and regular rhythm.      Heart sounds: Normal heart sounds.   Pulmonary:      Effort: Pulmonary effort is normal.      Breath sounds: Normal breath sounds.   Chest:   Breasts:     Breasts " are symmetrical.      Right: Normal. No inverted nipple, mass, nipple discharge, skin change or tenderness.      Left: Normal. No inverted nipple, mass, nipple discharge, skin change or tenderness.   Abdominal:      General: Abdomen is flat. There is no distension.      Palpations: Abdomen is soft. There is no mass.      Tenderness: There is no abdominal tenderness. There is no right CVA tenderness or left CVA tenderness.   Genitourinary:     General: Normal vulva.      Exam position: Lithotomy position.      Pubic Area: No rash or pubic lice.       Labia:         Right: No rash or tenderness.         Left: No rash or tenderness.       Urethra: No urethral pain.      Vagina: Normal. No vaginal discharge.      Cervix: Normal.      Uterus: Normal. No uterine prolapse.       Adnexa: Right adnexa normal and left adnexa normal.        Right: No mass or tenderness.          Left: No mass or tenderness.     Musculoskeletal:         General: No tenderness. Normal range of motion.      Cervical back: Normal range of motion. No tenderness.      Right lower leg: No edema.      Left lower leg: No edema.   Lymphadenopathy:      Cervical: No cervical adenopathy.      Upper Body:      Right upper body: No supraclavicular or axillary adenopathy.      Left upper body: No supraclavicular or axillary adenopathy.      Lower Body: No right inguinal adenopathy. No left inguinal adenopathy.   Skin:     General: Skin is warm and dry.   Neurological:      Mental Status: She is alert and oriented to person, place, and time.      Motor: No weakness.   Psychiatric:         Mood and Affect: Mood normal.         Behavior: Behavior normal.         Thought Content: Thought content normal.         Judgment: Judgment normal.         There are no Patient Instructions on file for this visit.

## 2024-01-11 ENCOUNTER — ANNUAL EXAM (OUTPATIENT)
Dept: OBGYN CLINIC | Facility: CLINIC | Age: 59
End: 2024-01-11
Payer: COMMERCIAL

## 2024-01-11 VITALS
HEIGHT: 65 IN | SYSTOLIC BLOOD PRESSURE: 128 MMHG | BODY MASS INDEX: 25.09 KG/M2 | DIASTOLIC BLOOD PRESSURE: 72 MMHG | WEIGHT: 150.6 LBS

## 2024-01-11 DIAGNOSIS — Z01.419 WELL WOMAN EXAM: Primary | ICD-10-CM

## 2024-01-11 PROCEDURE — S0612 ANNUAL GYNECOLOGICAL EXAMINA: HCPCS

## 2024-01-20 ENCOUNTER — OFFICE VISIT (OUTPATIENT)
Dept: URGENT CARE | Facility: MEDICAL CENTER | Age: 59
End: 2024-01-20
Payer: COMMERCIAL

## 2024-01-20 VITALS
BODY MASS INDEX: 24.49 KG/M2 | DIASTOLIC BLOOD PRESSURE: 84 MMHG | HEIGHT: 65 IN | WEIGHT: 147 LBS | HEART RATE: 73 BPM | OXYGEN SATURATION: 98 % | RESPIRATION RATE: 18 BRPM | SYSTOLIC BLOOD PRESSURE: 144 MMHG | TEMPERATURE: 98 F

## 2024-01-20 DIAGNOSIS — N39.0 URINARY TRACT INFECTION WITHOUT HEMATURIA, SITE UNSPECIFIED: Primary | ICD-10-CM

## 2024-01-20 DIAGNOSIS — R35.0 URINARY FREQUENCY: ICD-10-CM

## 2024-01-20 LAB
SL AMB  POCT GLUCOSE, UA: ABNORMAL
SL AMB LEUKOCYTE ESTERASE,UA: ABNORMAL
SL AMB POCT BILIRUBIN,UA: ABNORMAL
SL AMB POCT BLOOD,UA: ABNORMAL
SL AMB POCT CLARITY,UA: ABNORMAL
SL AMB POCT COLOR,UA: ABNORMAL
SL AMB POCT KETONES,UA: ABNORMAL
SL AMB POCT NITRITE,UA: ABNORMAL
SL AMB POCT PH,UA: 6.5
SL AMB POCT SPECIFIC GRAVITY,UA: 1
SL AMB POCT URINE PROTEIN: ABNORMAL
SL AMB POCT UROBILINOGEN: 0.2

## 2024-01-20 PROCEDURE — 99213 OFFICE O/P EST LOW 20 MIN: CPT

## 2024-01-20 PROCEDURE — 81002 URINALYSIS NONAUTO W/O SCOPE: CPT

## 2024-01-20 PROCEDURE — 87086 URINE CULTURE/COLONY COUNT: CPT

## 2024-01-20 PROCEDURE — 87186 SC STD MICRODIL/AGAR DIL: CPT

## 2024-01-20 PROCEDURE — 87077 CULTURE AEROBIC IDENTIFY: CPT

## 2024-01-20 RX ORDER — PHENAZOPYRIDINE HYDROCHLORIDE 95 MG/1
95 TABLET ORAL 3 TIMES DAILY PRN
Qty: 10 TABLET | Refills: 0 | Status: SHIPPED | OUTPATIENT
Start: 2024-01-20

## 2024-01-20 RX ORDER — CEPHALEXIN 500 MG/1
500 CAPSULE ORAL EVERY 12 HOURS SCHEDULED
Qty: 14 CAPSULE | Refills: 0 | Status: SHIPPED | OUTPATIENT
Start: 2024-01-20 | End: 2024-01-27

## 2024-01-20 NOTE — PROGRESS NOTES
[facility]        NAME: Galina Liriano is a 58 y.o. female  : 1965    MRN: [id number]  DATE: 2024  TIME: 1:44 PM    Assessment and Plan   Urinary tract infection without hematuria, site unspecified [N39.0]  1. Urinary tract infection without hematuria, site unspecified  phenazopyridine (PYRIDIUM) 95 MG tablet    cephalexin (KEFLEX) 500 mg capsule      2. Urinary frequency  Urine culture    POCT urine dip      Pt presents with symptoms consistent with possible UTI. UA results are consistent with urinary tract infection.  Pt will be started on keflex empirically to treat. Will send urine for culture and notify her if there is any resistance. Pt will follow-up with her PCP in 2-3 days if symptoms are not improved. We discussed symptoms of pyelonephritis and urosepsis and when to report to the emergency department.        Patient Instructions       Follow up with PCP in 3-5 days.  Proceed to  ER if symptoms worsen.    Chief Complaint     Chief Complaint   Patient presents with   • Possible UTI     Pt reports with urinary urgency and frequency started today.         History of Present Illness       Patient is a 58-year-old female presenting with 6 hours of urinary frequency, and urgency.  Patient denies burning with urination, no abdominal pain, no nausea vomiting or diarrhea.  Patient denies change in sexual partners, no vaginal bleeding or discharge.  No concern for STIs. Patient denies taking anything for her symptoms        Review of Systems   Review of Systems   Genitourinary:  Positive for frequency and urgency.         Current Medications       Current Outpatient Medications:   •  cephalexin (KEFLEX) 500 mg capsule, Take 1 capsule (500 mg total) by mouth every 12 (twelve) hours for 7 days, Disp: 14 capsule, Rfl: 0  •  Lysine 1000 MG TABS, Take by mouth, Disp: , Rfl:   •  multivitamin (THERAGRAN) TABS, Take 1 tablet by mouth daily, Disp: , Rfl:   •  OIL OF OREGANO PO, Take by mouth,  Disp: , Rfl:   •  phenazopyridine (PYRIDIUM) 95 MG tablet, Take 1 tablet (95 mg total) by mouth 3 (three) times a day as needed for bladder spasms, Disp: 10 tablet, Rfl: 0  •  Vitamin C-Vitamin D-Zinc (D3/VITAMIN C/ZINC PO), Take by mouth, Disp: , Rfl:   •  vitamin E 100 UNIT capsule, Take 100 Units by mouth daily, Disp: , Rfl:     Current Allergies     Allergies as of 01/20/2024 - Reviewed 01/20/2024   Allergen Reaction Noted   • Erythromycin Vomiting 08/23/2016            The following portions of the patient's history were reviewed and updated as appropriate: allergies, current medications, past family history, past medical history, past social history, past surgical history and problem list.     Past Medical History:   Diagnosis Date   • Seasonal allergies    • Skin cancer     foot       Past Surgical History:   Procedure Laterality Date   • COLON SURGERY      ruptured appendix and colon - 1 foot of colon removed   • LAMINECTOMY FOR IMPLANTATION / PLACEMENT NEUROSTIMULATOR ELECTRODES  2015    SJ3   • [place] INS/RPLC PERPH SAC/GSTRC NPG/RCVR PCKT CRTJ&CONN Right 2/26/2019    Procedure: CHANGE OF THE SACRAL NEUROSTIMULATOR IMPLANT;  Surgeon: Shala Griffith MD;  Location: [facility];  Service: Colorectal       Family History   Problem Relation Age of Onset   • Heart attack Mother 52        fatal   • Diabetes Mother    • Colon cancer Father 65   • Prostate cancer Father 65   • Skin cancer Father    • No Known Problems Sister    • Cancer Maternal Grandmother         Gastric cancer   • No Known Problems Maternal Grandfather    • No Known Problems Paternal Grandmother    • Skin cancer Paternal Grandfather    • No Known Problems Maternal Aunt    • No Known Problems Maternal Aunt    • No Known Problems Maternal Aunt    • No Known Problems Paternal Aunt    • No Known Problems Paternal Aunt    • No Known Problems Paternal Aunt    • Breast cancer Neg Hx          Medications have been verified.        Objective   /84   " Pulse 73   Temp 98 °F (36.7 °C) (Temporal)   Resp 18   Ht 5' 5\" (1.651 m)   Wt 66.7 kg (147 lb)   LMP 06/03/2019 (Exact Date)   SpO2 98%   BMI 24.46 kg/m²   Patient's last menstrual period was 06/03/2019 (exact date).       Physical Exam     Physical Exam  Vitals and nursing note reviewed.   Constitutional:       General: She is not in acute distress.     Appearance: Normal appearance. She is normal weight. She is not ill-appearing.   Cardiovascular:      Pulses: Normal pulses.      Heart sounds: Normal heart sounds.   Pulmonary:      Effort: Pulmonary effort is normal.   Abdominal:      General: Abdomen is flat. Bowel sounds are normal. There is no distension.      Palpations: Abdomen is soft.      Tenderness: There is no abdominal tenderness. There is no right CVA tenderness, left CVA tenderness or guarding.   Skin:     General: Skin is warm and dry.      Capillary Refill: Capillary refill takes less than 2 seconds.   Neurological:      Mental Status: She is alert.                   "

## 2024-01-20 NOTE — PATIENT INSTRUCTIONS
Take antibiotics as prescribed. Complete the entire course. If you do not complete the entire course this may result in bacterial resistance making future infections very difficult or impossible to treat. You should never have leftover antibiotics unless your antibiotic is switched. Note that if you are taking birth control medications, antibiotics can decrease their effectiveness. Recommend abstinence or back up method while on antibiotics and for 3-5 days after completing the antibiotic course.   We send all positive urine for culture, we will call you if your antibiotic needs to be changed based on culture results.   If symptoms do not improve in 2-3 days, follow-up with your primary care provider.   If you develop fever, chills, or worsening low back pain report to the emergency room. These are symptoms of a more serious condition or possible spread of the infection into your bloodstream.     Urinary Tract Infection in Women   AMBULATORY CARE:   A urinary tract infection (UTI)  is caused by bacteria that get inside your urinary tract. Most bacteria that enter your urinary tract come out when you urinate. If the bacteria stay in your urinary tract, you may get an infection. Your urinary tract includes your kidneys, ureters, bladder, and urethra. Urine is made in your kidneys, and it flows from the ureters to the bladder. Urine leaves the bladder through the urethra. A UTI is more common in your lower urinary tract, which includes your bladder and urethra.      Common symptoms include the following:   Urinating more often or waking from sleep to urinate    Pain or burning when you urinate    Pain or pressure in your lower abdomen     Urine that smells bad    Blood in your urine    Leaking urine    Seek care immediately if:   You are urinating very little or not at all.    You have a high fever with shaking chills.     You have side or back pain that gets worse.    Call your doctor if:   You have a fever.    You do  not feel better after 2 days of taking antibiotics.    You are vomiting.     You have questions or concerns about your condition or care.    Treatment for a UTI  may include antibiotics to treat a bacterial infection. You may also need medicines to decrease pain and burning, or decrease the urge to urinate often. If you have UTIs often (called recurrent UTIs), you may be given antibiotics to take regularly. You will be given directions for when and how to use antibiotics. The goal is to prevent UTIs but not cause antibiotic resistance by using antibiotics too often.  Prevent a UTI:   Empty your bladder often.  Urinate and empty your bladder as soon as you feel the need. Do not hold your urine for long periods of time.    Wipe from front to back after you urinate or have a bowel movement.  This will help prevent germs from getting into your urinary tract through your urethra.    Drink liquids as directed.  Ask how much liquid to drink each day and which liquids are best for you. You may need to drink more liquids than usual to help flush out the bacteria. Do not drink alcohol, caffeine, or citrus juices. These can irritate your bladder and increase your symptoms. Your healthcare provider may recommend cranberry juice to help prevent a UTI.    Urinate after you have sex.  This can help flush out bacteria passed during sex.    Do not douche or use feminine deodorants.  These can change the chemical balance in your vagina.    Change sanitary pads or tampons often.  This will help prevent germs from getting into your urinary tract.    Talk to your healthcare provider about your birth control method.  You may need to change your method if it is increasing your risk for UTIs.    Wear cotton underwear and clothes that are loose.  Tight pants and nylon underwear can trap moisture and cause bacteria to grow.    Vaginal estrogen may be recommended.  This medicine helps prevent UTIs in women who have gone through menopause or  are in heath-menopause.    Do pelvic muscle exercises often.  Pelvic muscle exercises may help you start and stop urinating. Strong pelvic muscles may help you empty your bladder easier. Squeeze these muscles tightly for 5 seconds like you are trying to hold back urine. Then relax for 5 seconds. Gradually work up to squeezing for 10 seconds. Do 3 sets of 15 repetitions a day, or as directed.    Follow up with your healthcare provider as directed:  Write down your questions so you remember to ask them during your visits.   © Copyright [employer] 2020 Information is for End User's use only and may not be sold, redistributed or otherwise used for commercial purposes. All illustrations and images included in CareNotes® are the copyrighted property of [employer], [employer]. or [employer]  The above information is an  only. It is not intended as medical advice for individual conditions or treatments. Talk to your doctor, nurse or pharmacist before following any medical regimen to see if it is safe and effective for you.

## 2024-01-23 LAB — BACTERIA UR CULT: ABNORMAL

## 2024-01-31 ENCOUNTER — TELEPHONE (OUTPATIENT)
Age: 59
End: 2024-01-31

## 2024-01-31 NOTE — TELEPHONE ENCOUNTER
LM for patient to return call to provide update on interstim device and offer in office interrogation with [employer] rep if needed. Please forward her callback to the office.

## 2024-02-12 ENCOUNTER — OFFICE VISIT (OUTPATIENT)
Dept: URGENT CARE | Facility: MEDICAL CENTER | Age: 59
End: 2024-02-12
Payer: COMMERCIAL

## 2024-02-12 VITALS
BODY MASS INDEX: 24.16 KG/M2 | TEMPERATURE: 97.8 F | SYSTOLIC BLOOD PRESSURE: 145 MMHG | WEIGHT: 145 LBS | RESPIRATION RATE: 18 BRPM | HEART RATE: 66 BPM | OXYGEN SATURATION: 99 % | HEIGHT: 65 IN | DIASTOLIC BLOOD PRESSURE: 83 MMHG

## 2024-02-12 DIAGNOSIS — R05.1 ACUTE COUGH: Primary | ICD-10-CM

## 2024-02-12 PROCEDURE — 99213 OFFICE O/P EST LOW 20 MIN: CPT | Performed by: PHYSICIAN ASSISTANT

## 2024-02-12 RX ORDER — AMOXICILLIN 500 MG/1
500 CAPSULE ORAL EVERY 8 HOURS SCHEDULED
Qty: 21 CAPSULE | Refills: 0 | Status: SHIPPED | OUTPATIENT
Start: 2024-02-12 | End: 2024-02-19

## 2024-02-12 RX ORDER — BENZONATATE 100 MG/1
100 CAPSULE ORAL 3 TIMES DAILY PRN
Qty: 20 CAPSULE | Refills: 0 | Status: SHIPPED | OUTPATIENT
Start: 2024-02-12

## 2024-02-12 NOTE — PATIENT INSTRUCTIONS
Cough  amoxicillin as directed  Tessalon pearls as needed for cough  Follow up with PCP in 3-5 days.  Proceed to  ER if symptoms worsen.

## 2024-02-12 NOTE — PROGRESS NOTES
[facility]        NAME: Galina Lriiano is a 58 y.o. female  : 1965    MRN: [id number]  DATE: 2024  TIME: 12:26 PM    Assessment and Plan   Acute cough [R05.1]  1. Acute cough              Patient Instructions   Cough  amoxicillin as directed  Tessalon pearls as needed for cough  Follow up with PCP in 3-5 days.  Proceed to  ER if symptoms worsen.    Chief Complaint     Chief Complaint   Patient presents with    Cough     Pt has a productive cough with yellow mucus x3 weeks.          History of Present Illness       58-year-old female who presents complaining of cough, postnasal drip x 3 weeks.  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  Denies fevers, chills, shortness of breath, chest pain.        Review of Systems   Review of Systems   Constitutional:  Positive for fever. Negative for activity change, appetite change, chills, diaphoresis and fatigue.   HENT:  Positive for congestion and rhinorrhea. Negative for ear discharge, ear pain, facial swelling, hearing loss, mouth sores, nosebleeds, postnasal drip, sinus pressure, sinus pain, sneezing, sore throat and voice change.    Respiratory:  Positive for cough. Negative for apnea, choking, chest tightness, shortness of breath, wheezing and stridor.    Cardiovascular: Negative.          Current Medications       Current Outpatient Medications:     Lysine 1000 MG TABS, Take by mouth, Disp: , Rfl:     multivitamin (THERAGRAN) TABS, Take 1 tablet by mouth daily, Disp: , Rfl:     OIL OF OREGANO PO, Take by mouth, Disp: , Rfl:     Vitamin C-Vitamin D-Zinc (D3/VITAMIN C/ZINC PO), Take by mouth, Disp: , Rfl:     vitamin E 100 UNIT capsule, Take 100 Units by mouth daily, Disp: , Rfl:     phenazopyridine (PYRIDIUM) 95 MG tablet, Take 1 tablet (95 mg total) by mouth 3 (three) times a day as needed for bladder spasms (Patient not taking: Reported on 2024),  "Disp: 10 tablet, Rfl: 0    Current Allergies     Allergies as of 02/12/2024 - Reviewed 02/12/2024   Allergen Reaction Noted    Erythromycin Vomiting 08/23/2016            The following portions of the patient's history were reviewed and updated as appropriate: allergies, current medications, past family history, past medical history, past social history, past surgical history and problem list.     Past Medical History:   Diagnosis Date    Seasonal allergies     Skin cancer     foot       Past Surgical History:   Procedure Laterality Date    COLON SURGERY      ruptured appendix and colon - 1 foot of colon removed    LAMINECTOMY FOR IMPLANTATION / PLACEMENT NEUROSTIMULATOR ELECTRODES  2015    SJ3    [place] INS/RPLC PERPH SAC/GSTRC NPG/RCVR PCKT CRTJ&CONN Right 2/26/2019    Procedure: CHANGE OF THE SACRAL NEUROSTIMULATOR IMPLANT;  Surgeon: Shala Griffith MD;  [facility]: [facility];  Service: Colorectal       Family History   Problem Relation Age of Onset    Heart attack Mother 52        fatal    Diabetes Mother     Colon cancer Father 65    Prostate cancer Father 65    Skin cancer Father     No Known Problems Sister     Cancer Maternal Grandmother         Gastric cancer    No Known Problems Maternal Grandfather     No Known Problems Paternal Grandmother     Skin cancer Paternal Grandfather     No Known Problems Maternal Aunt     No Known Problems Maternal Aunt     No Known Problems Maternal Aunt     No Known Problems Paternal Aunt     No Known Problems Paternal Aunt     No Known Problems Paternal Aunt     Breast cancer Neg Hx          Medications have been verified.        Objective   /83   Pulse 66   Temp 97.8 °F (36.6 °C) (Temporal)   Resp 18   Ht 5' 5\" (1.651 m)   Wt 65.8 kg (145 lb)   LMP 06/03/2019 (Exact Date)   SpO2 99%   BMI 24.13 kg/m²        Physical Exam     Physical Exam  Constitutional:       General: She is not in acute distress.     Appearance: Normal appearance. She is well-developed. She " is not diaphoretic.   HENT:      Head: Normocephalic and atraumatic.      Right Ear: Hearing, tympanic membrane, ear canal and external ear normal.      Left Ear: Hearing, tympanic membrane, ear canal and external ear normal.      Nose: Rhinorrhea present.      Mouth/Throat:      Pharynx: Uvula midline.   Cardiovascular:      Rate and Rhythm: Normal rate and regular rhythm.      Heart sounds: Normal heart sounds.   Pulmonary:      Effort: Pulmonary effort is normal. No respiratory distress.      Breath sounds: Normal breath sounds. No stridor. No wheezing, rhonchi or rales.   Chest:      Chest wall: No tenderness.   Musculoskeletal:      Cervical back: Normal range of motion and neck supple.   Lymphadenopathy:      Cervical: Cervical adenopathy present.   Neurological:      Mental Status: She is alert.

## 2024-03-13 ENCOUNTER — TELEPHONE (OUTPATIENT)
Age: 59
End: 2024-03-13

## 2024-03-13 NOTE — TELEPHONE ENCOUNTER
Patients GI provider:       Number to return call: [phone]    Reason for call: Pt called in regard to rescheduling 3/27/2024 with Mary. Pt stated if she does not answer leave message.     Scheduled procedure/appointment date if applicable: N/A

## 2024-03-18 NOTE — TELEPHONE ENCOUNTER
Contacted pt to offer 3/19/2024 appt at 11:10 AM, however was unable to reach; v/m left for pt to contact [facility].  If pt confirms date and appt, this appointment will not show up on her appointment desk/MyChart

## 2024-05-20 ENCOUNTER — HOSPITAL ENCOUNTER (OUTPATIENT)
Dept: RADIOLOGY | Age: 59
Discharge: HOME | End: 2024-05-20
Payer: COMMERCIAL

## 2024-05-20 VITALS — WEIGHT: 145 LBS | HEIGHT: 65 IN | BODY MASS INDEX: 24.16 KG/M2

## 2024-05-20 DIAGNOSIS — Z12.31 ENCOUNTER FOR SCREENING MAMMOGRAM FOR MALIGNANT NEOPLASM OF BREAST: ICD-10-CM

## 2024-05-20 PROCEDURE — 77063 BREAST TOMOSYNTHESIS BI: CPT

## 2024-05-20 PROCEDURE — 77067 SCR MAMMO BI INCL CAD: CPT

## 2024-10-01 ENCOUNTER — OFFICE VISIT (OUTPATIENT)
Dept: DERMATOLOGY | Facility: CLINIC | Age: 59
End: 2024-10-01
Payer: COMMERCIAL

## 2024-10-01 VITALS — TEMPERATURE: 97.8 F | WEIGHT: 148.6 LBS | BODY MASS INDEX: 24.76 KG/M2 | HEIGHT: 65 IN

## 2024-10-01 DIAGNOSIS — L82.1 SEBORRHEIC KERATOSES: ICD-10-CM

## 2024-10-01 DIAGNOSIS — L85.3 XEROSIS OF SKIN: ICD-10-CM

## 2024-10-01 DIAGNOSIS — Z12.83 SKIN CANCER SCREENING: Primary | ICD-10-CM

## 2024-10-01 DIAGNOSIS — L81.4 LENTIGINES: ICD-10-CM

## 2024-10-01 DIAGNOSIS — D23.9 HIDROCYSTOMA: ICD-10-CM

## 2024-10-01 DIAGNOSIS — D22.9 MULTIPLE MELANOCYTIC NEVI: ICD-10-CM

## 2024-10-01 DIAGNOSIS — D18.01 CHERRY ANGIOMA: ICD-10-CM

## 2024-10-01 PROCEDURE — 99203 OFFICE O/P NEW LOW 30 MIN: CPT | Performed by: DERMATOLOGY

## 2024-10-01 NOTE — PATIENT INSTRUCTIONS
"HISTORY OF SKIN CANCER  Assessment and Plan:  Based on a thorough discussion of this condition and the management approach to it (including a comprehensive discussion of the known risks, side effects and potential benefits of treatment), the patient (family) agrees to implement the following specific plan:  When outside we recommend using a wide brim hat, sunglasses, long sleeve and pants, sunscreen with SPF 30+ with reapplication every 2 hours, or SPF specific clothing       HIDROCYSTOMA     Assessment and Plan:  Based on a thorough discussion of this condition and the management approach to it (including a comprehensive discussion of the known risks, side effects and potential benefits of treatment), the patient (family) agrees to implement the following specific plan:  Benign; reassured   Can be removed if bothersome      MELANOCYTIC NEVI (\"Moles\")    Assessment and Plan:  Based on a thorough discussion of this condition and the management approach to it (including a comprehensive discussion of the known risks, side effects and potential benefits of treatment), the patient (family) agrees to implement the following specific plan:  When outside we recommend using a wide brim hat, sunglasses, long sleeve and pants, sunscreen with SPF 30+ with reapplication every 2 hours, or SPF specific clothing   Benign, reassured  Annual skin check     Melanocytic Nevi  Melanocytic nevi (\"moles\") are tan or brown, raised or flat areas of the skin which have an increased number of melanocytes. Melanocytes are the cells in our body which make pigment and account for skin color.    Some moles are present at birth (I.e., \"congenital nevi\"), while others come up later in life (i.e., \"acquired nevi\").  The sun can stimulate the body to make more moles.  Sunburns are not the only thing that triggers more moles.  Chronic sun exposure can do it too.     Clinically distinguishing a healthy mole from melanoma may be difficult, even for " "experienced dermatologists. The \"ABCDE's\" of moles have been suggested as a means of helping to alert a person to a suspicious mole and the possible increased risk of melanoma.  The suggestions for raising alert are as follows:    Asymmetry: Healthy moles tend to be symmetric, while melanomas are often asymmetric.  Asymmetry means if you draw a line through the mole, the two halves do not match in color, size, shape, or surface texture. Asymmetry can be a result of rapid enlargement of a mole, the development of a raised area on a previously flat lesion, scaling, ulceration, bleeding or scabbing within the mole.  Any mole that starts to demonstrate \"asymmetry\" should be examined promptly by a board certified dermatologist.     Border: Healthy moles tend to have discrete, even borders.  The border of a melanoma often blends into the normal skin and does not sharply delineate the mole from normal skin.  Any mole that starts to demonstrate \"uneven borders\" should be examined promptly by a board certified dermatologist.     Color: Healthy moles tend to be one color throughout.  Melanomas tend to be made up of different colors ranging from dark black, blue, white, or red.  Any mole that demonstrates a color change should be examined promptly by a board certified dermatologist.     Diameter: Healthy moles tend to be smaller than 0.6 cm in size; an exception are \"congenital nevi\" that can be larger.  Melanomas tend to grow and can often be greater than 0.6 cm (1/4 of an inch, or the size of a pencil eraser). This is only a guideline, and many normal moles may be larger than 0.6 cm without being unhealthy.  Any mole that starts to change in size (small to bigger or bigger to smaller) should be examined promptly by a board certified dermatologist.     Evolving: Healthy moles tend to \"stay the same.\"  Melanomas may often show signs of change or evolution such as a change in size, shape, color, or elevation.  Any mole that " starts to itch, bleed, crust, burn, hurt, or ulcerate or demonstrate a change or evolution should be examined promptly by a board certified dermatologist.        LENTIGO    Assessment and Plan:  Based on a thorough discussion of this condition and the management approach to it (including a comprehensive discussion of the known risks, side effects and potential benefits of treatment), the patient (family) agrees to implement the following specific plan:  When outside we recommend using a wide brim hat, sunglasses, long sleeve and pants, sunscreen with SPF 30+ with reapplication every 2 hours, or SPF specific clothing       What is a lentigo?  A lentigo is a pigmented flat or slightly raised lesion with a clearly defined edge. Unlike an ephelis (freckle), it does not fade in the winter months. There are several kinds of lentigo.  The name lentigo originally referred to its appearance resembling a small lentil. The plural of lentigo is lentigines, although “lentigos” is also in common use.    Who gets lentigines?  Lentigines can affect males and females of all ages and races. Solar lentigines are especially prevalent in fair skinned adults. Lentigines associated with syndromes are present at birth or arise during childhood.    What causes lentigines?  Common forms of lentigo are due to exposure to ultraviolet radiation:  Sun damage including sunburn   Indoor tanning   Phototherapy, especially photochemotherapy (PUVA)    Ionizing radiation, eg radiation therapy, can also cause lentigines.  Several familial syndromes associated with widespread lentigines originate from mutations in Carlton-MAP kinase, mTOR signaling and PTEN pathways.    What is the treatment for lentigines?  Most lentigines are left alone. Attempts to lighten them may not be successful. The following approaches are used:  SPF 50+ broad-spectrum sunscreen   Hydroquinone bleaching cream   Alpha hydroxy acids   Vitamin C   Retinoids   Azelaic acid   Chemical  "peels  Individual lesions can be permanently removed using:  Cryotherapy   Intense pulsed light   Pigment lasers    How can lentigines be prevented?  Lentigines associated with exposure ultraviolet radiation can be prevented by very careful sun protection. Clothing is more successful at preventing new lentigines than are sunscreens.    What is the outlook for lentigines?  Lentigines usually persist. They may increase in number with age and sun exposure. Some in sun-protected sites may fade and disappear.    CHILDERS ANGIOMAS    Assessment and Plan:  Based on a thorough discussion of this condition and the management approach to it (including a comprehensive discussion of the known risks, side effects and potential benefits of treatment), the patient (family) agrees to implement the following specific plan:  Monitor for changes  Benign, reassured      Assessment and Plan:    Cherry angioma, also known as [place] Maximiliano spots, are benign vascular skin lesions. A \"cherry angioma\" is a firm red, blue or purple papule, 0.1-1 cm in diameter. When thrombosed, they can appear black in colour until evaluated with a dermatoscope when the red or purple colour is more easily seen. Cherry angioma may develop on any part of the body but most often appear on the scalp, face, lips and trunk.  An angioma is due to proliferating endothelial cells; these are the cells that line the inside of a blood vessel.    Angiomas can arise in early life or later in life; the most common type of angioma is a cherry angioma.  Cherry angiomas are very common in males and females of any age or race. They are more noticeable in white skin than in skin of colour. They markedly increase in number from about the age of 40. There may be a family history of similar lesions. Eruptive cherry angiomas have been rarely reported to be associated with internal malignancy. The cause of angiomas is unknown. Genetic analysis of cherry angiomas has shown that " "they frequently carry specific somatic missense mutations in the GNAQ and GNA11 (Q209H) genes, which are involved in other vascular and melanocytic proliferations.      SEBORRHEIC KERATOSIS; NON-INFLAMED    Assessment and Plan:  Based on a thorough discussion of this condition and the management approach to it (including a comprehensive discussion of the known risks, side effects and potential benefits of treatment), the patient (family) agrees to implement the following specific plan:  Monitor for changes  Benign, reassured      Seborrheic Keratosis  A seborrheic keratosis is a harmless warty spot that appears during adult life as a common sign of skin aging.  Seborrheic keratoses can arise on any area of skin, covered or uncovered, with the usual exception of the palms and soles. They do not arise from mucous membranes. Seborrheic keratoses can have highly variable appearance.      Seborrheic keratoses are extremely common. It has been estimated that over 90% of adults over the age of 60 years have one or more of them. They occur in males and females of all races, typically beginning to erupt in the 30s or 40s. They are uncommon under the age of 20 years.  The precise cause of seborrhoeic keratoses is not known.  Seborrhoeic keratoses are considered degenerative in nature. As time goes by, seborrheic keratoses tend to become more numerous. Some people inherit a tendency to develop a very large number of them; some people may have hundreds of them.      There is no easy way to remove multiple lesions on a single occasion.  Unless a specific lesion is \"inflamed\" and is causing pain or stinging/burning or is bleeding, most insurance companies do not authorize treatment.    XEROSIS (\"DRY SKIN\")    Assessment and Plan:  Based on a thorough discussion of this condition and the management approach to it (including a comprehensive discussion of the known risks, side effects and potential benefits of treatment), the " patient (family) agrees to implement the following specific plan:  Use moisturizer like Eucerin,Cerave or Aveeno Cream 3 times a day for the dry skin            Dry skin refers to skin that feels dry to touch. Dry skin has a dull surface with a rough, scaly quality. The skin is less pliable and cracked. When dryness is severe, the skin may become inflamed and fissured.  Although any body site can be dry, dry skin tends to affect the shins more than any other site.    Dry skin is lacking moisture in the outer horny cell layer (stratum corneum) and this results in cracks in the skin surface.  Dry skin is also called xerosis, xeroderma or asteatosis (lack of fat).  It can affect males and females of all ages. There is some racial variability in water and lipid content of the skin.  Dry skin that starts in early childhood may be one of about 20 types of ichthyosis (fish-scale skin). There is often a family history of dry skin.   Dry skin is commonly seen in people with atopic dermatitis.  Nearly everyone > 60 years has dry skin.    Dry skin that begins later may be seen in people with certain diseases and conditions.  Postmenopausal women  Hypothyroidism  Chronic renal disease   Malnutrition and weight loss   Subclinical dermatitis   Treatment with certain drugs such as oral retinoids, diuretics and epidermal growth factor receptor inhibitors      What is the treatment for dry skin?  The mainstay of treatment of dry skin and ichthyosis is moisturisers/emollients. They should be applied liberally and often enough to:  Reduce itch   Improve the barrier function   Prevent entry of irritants, bacteria   Reduce transepidermal water loss.      How can dry skin be prevented?  Eliminate aggravating factors:  Reduce the frequency of bathing.   A humidifier in winter and air conditioner in summer   Compare having a short shower with a prolonged soak in a bath.   Use lukewarm, not hot, water.   Replace standard soap with a  substitute such as a synthetic detergent cleanser, water-miscible emollient, bath oil, anti-pruritic tar oil, colloidal oatmeal etc.   Apply an emollient liberally and often, particularly shortly after bathing, and when itchy. The drier the skin, the thicker this should be, especially on the hands.    What is the outlook for dry skin?  A tendency to dry skin may persist life-long, or it may improve once contributing factors are controlled.

## 2024-10-01 NOTE — PROGRESS NOTES
"[facility]ermatology Clinic Note     Patient Name: Galina Liriano  Encounter Date: 10/01/2024     Have you been cared for by a [facility] Dermatologist in the last 3 years and, if so, which description applies to you?    NO.   I am considered a \"new\" patient and must complete all patient intake questions. I am FEMALE/of child-bearing potential.    REVIEW OF SYSTEMS:  Have you recently had or currently have any of the following? Recent fever or chills? No  Any non-healing wound? No  Are you pregnant or planning to become pregnant? No  Are you currently or planning to be nursing or breast feeding? No   PAST MEDICAL HISTORY:  Have you personally ever had or currently have any of the following?  If \"YES,\" then please provide more detail. Skin cancer (such as Melanoma, Basal Cell Carcinoma, Squamous Cell Carcinoma?  YES, unknown which kind  Tuberculosis, HIV/AIDS, Hepatitis B or C: No  Radiation Treatment No   HISTORY OF IMMUNOSUPPRESSION:   Do you have a history of any of the following:  Systemic Immunosuppression such as Diabetes, Biologic or Immunotherapy, Chemotherapy, Organ Transplantation, Bone Marrow Transplantation or Prednsione?  No    Answering \"YES\" requires the addition of the dotphrase \"IMMUNOSUPPRESSED\" as the first diagnosis of the patient's visit.   FAMILY HISTORY:  Any \"first degree relatives\" (parent, brother, sister, or child) with the following?    Skin Cancer, Pancreatic or Other Cancer? YES, see family history, dad had skin cancer   PATIENT EXPERIENCE:    Do you want the Dermatologist to perform a COMPLETE skin exam today including a clinical examination under the \"bra and underwear\" areas?  Yes  If necessary, do we have your permission to call and leave a detailed message on your Preferred Phone number that includes your specific medical information?  Yes      Allergies   Allergen Reactions    Erythromycin Vomiting      Current Outpatient Medications:     benzonatate (TESSALON PERLES) 100 mg " capsule, Take 1 capsule (100 mg total) by mouth 3 (three) times a day as needed for cough, Disp: 20 capsule, Rfl: 0    Lysine 1000 MG TABS, Take by mouth, Disp: , Rfl:     multivitamin (THERAGRAN) TABS, Take 1 tablet by mouth daily, Disp: , Rfl:     OIL OF OREGANO PO, Take by mouth, Disp: , Rfl:     phenazopyridine (PYRIDIUM) 95 MG tablet, Take 1 tablet (95 mg total) by mouth 3 (three) times a day as needed for bladder spasms (Patient not taking: Reported on 2/12/2024), Disp: 10 tablet, Rfl: 0    Vitamin C-Vitamin D-Zinc (D3/VITAMIN C/ZINC PO), Take by mouth, Disp: , Rfl:     vitamin E 100 UNIT capsule, Take 100 Units by mouth daily, Disp: , Rfl:           Whom besides the patient is providing clinical information about today's encounter?   NO ADDITIONAL HISTORIAN (patient alone provided history)    Physical Exam and Assessment/Plan by Diagnosis:    New patient with history of skin cancer. No areas of concern. Here for a FBSE.    HISTORY OF SKIN CANCER  Physical Exam:  Anatomic Location Affected:  back, left lower leg  Morphological Description:  well healed scars  Pertinent Positives:  Pertinent Negatives:    Additional History of Present Condition:  patient notes having skin cancers removed from a previous dermatologist who retired.     Assessment and Plan:  Based on a thorough discussion of this condition and the management approach to it (including a comprehensive discussion of the known risks, side effects and potential benefits of treatment), the patient (family) agrees to implement the following specific plan:  When outside we recommend using a wide brim hat, sunglasses, long sleeve and pants, sunscreen with SPF 30+ with reapplication every 2 hours, or SPF specific clothing         HIDROCYSTOMA   Physical Exam:  Anatomic Location Affected:  left lateral canthus   Morphological Description:  translucent papule  Pertinent Positives:  Pertinent Negatives:    Additional History of Present Condition:  present on  "exam    Assessment and Plan:  Based on a thorough discussion of this condition and the management approach to it (including a comprehensive discussion of the known risks, side effects and potential benefits of treatment), the patient (family) agrees to implement the following specific plan:  Benign; reassured     MELANOCYTIC NEVI (\"Moles\")    Physical Exam:  Anatomic Location Affected:   Mostly on sun-exposed areas of the trunk and extremities  Morphological Description:  Scattered, 1-4mm round to ovoid, symmetrical-appearing, even bordered, skin colored to dark brown macules/papules, mostly in sun-exposed areas  Pertinent Positives:  Pertinent Negatives:    Additional History of Present Condition:      Assessment and Plan:  Based on a thorough discussion of this condition and the management approach to it (including a comprehensive discussion of the known risks, side effects and potential benefits of treatment), the patient (family) agrees to implement the following specific plan:  When outside we recommend using a wide brim hat, sunglasses, long sleeve and pants, sunscreen with SPF 30+ with reapplication every 2 hours, or SPF specific clothing   Benign, reassured  Annual skin check     Melanocytic Nevi  Melanocytic nevi (\"moles\") are tan or brown, raised or flat areas of the skin which have an increased number of melanocytes. Melanocytes are the cells in our body which make pigment and account for skin color.    Some moles are present at birth (I.e., \"congenital nevi\"), while others come up later in life (i.e., \"acquired nevi\").  The sun can stimulate the body to make more moles.  Sunburns are not the only thing that triggers more moles.  Chronic sun exposure can do it too.     Clinically distinguishing a healthy mole from melanoma may be difficult, even for experienced dermatologists. The \"ABCDE's\" of moles have been suggested as a means of helping to alert a person to a suspicious mole and the possible increased " "risk of melanoma.  The suggestions for raising alert are as follows:    Asymmetry: Healthy moles tend to be symmetric, while melanomas are often asymmetric.  Asymmetry means if you draw a line through the mole, the two halves do not match in color, size, shape, or surface texture. Asymmetry can be a result of rapid enlargement of a mole, the development of a raised area on a previously flat lesion, scaling, ulceration, bleeding or scabbing within the mole.  Any mole that starts to demonstrate \"asymmetry\" should be examined promptly by a board certified dermatologist.     Border: Healthy moles tend to have discrete, even borders.  The border of a melanoma often blends into the normal skin and does not sharply delineate the mole from normal skin.  Any mole that starts to demonstrate \"uneven borders\" should be examined promptly by a board certified dermatologist.     Color: Healthy moles tend to be one color throughout.  Melanomas tend to be made up of different colors ranging from dark black, blue, white, or red.  Any mole that demonstrates a color change should be examined promptly by a board certified dermatologist.     Diameter: Healthy moles tend to be smaller than 0.6 cm in size; an exception are \"congenital nevi\" that can be larger.  Melanomas tend to grow and can often be greater than 0.6 cm (1/4 of an inch, or the size of a pencil eraser). This is only a guideline, and many normal moles may be larger than 0.6 cm without being unhealthy.  Any mole that starts to change in size (small to bigger or bigger to smaller) should be examined promptly by a board certified dermatologist.     Evolving: Healthy moles tend to \"stay the same.\"  Melanomas may often show signs of change or evolution such as a change in size, shape, color, or elevation.  Any mole that starts to itch, bleed, crust, burn, hurt, or ulcerate or demonstrate a change or evolution should be examined promptly by a board certified dermatologist.  "       LENTIGO    Physical Exam:  Anatomic Location Affected:  trunk, arms  Morphological Description:  Light brown macules  Pertinent Positives:  Pertinent Negatives:    Additional History of Present Condition:      Assessment and Plan:  Based on a thorough discussion of this condition and the management approach to it (including a comprehensive discussion of the known risks, side effects and potential benefits of treatment), the patient (family) agrees to implement the following specific plan:  When outside we recommend using a wide brim hat, sunglasses, long sleeve and pants, sunscreen with SPF 30+ with reapplication every 2 hours, or SPF specific clothing       What is a lentigo?  A lentigo is a pigmented flat or slightly raised lesion with a clearly defined edge. Unlike an ephelis (freckle), it does not fade in the winter months. There are several kinds of lentigo.  The name lentigo originally referred to its appearance resembling a small lentil. The plural of lentigo is lentigines, although “lentigos” is also in common use.    Who gets lentigines?  Lentigines can affect males and females of all ages and races. Solar lentigines are especially prevalent in fair skinned adults. Lentigines associated with syndromes are present at birth or arise during childhood.    What causes lentigines?  Common forms of lentigo are due to exposure to ultraviolet radiation:  Sun damage including sunburn   Indoor tanning   Phototherapy, especially photochemotherapy (PUVA)    Ionizing radiation, eg radiation therapy, can also cause lentigines.  Several familial syndromes associated with widespread lentigines originate from mutations in Carlton-MAP kinase, mTOR signaling and PTEN pathways.    What is the treatment for lentigines?  Most lentigines are left alone. Attempts to lighten them may not be successful. The following approaches are used:  SPF 50+ broad-spectrum sunscreen   Hydroquinone bleaching cream   Alpha hydroxy acids  "  Vitamin C   Retinoids   Azelaic acid   Chemical peels  Individual lesions can be permanently removed using:  Cryotherapy   Intense pulsed light   Pigment lasers    How can lentigines be prevented?  Lentigines associated with exposure ultraviolet radiation can be prevented by very careful sun protection. Clothing is more successful at preventing new lentigines than are sunscreens.    What is the outlook for lentigines?  Lentigines usually persist. They may increase in number with age and sun exposure. Some in sun-protected sites may fade and disappear.    CHILDERS ANGIOMAS    Physical Exam:  Anatomic Location Affected:  trunk  Morphological Description:  Scattered cherry red, 1-4 mm papules.  Pertinent Positives:  Pertinent Negatives:    Additional History of Present Condition:      Assessment and Plan:  Based on a thorough discussion of this condition and the management approach to it (including a comprehensive discussion of the known risks, side effects and potential benefits of treatment), the patient (family) agrees to implement the following specific plan:  Monitor for changes  Benign, reassured      Assessment and Plan:    Cherry angioma, also known as Torres de Maximiliano spots, are benign vascular skin lesions. A \"cherry angioma\" is a firm red, blue or purple papule, 0.1-1 cm in diameter. When thrombosed, they can appear black in colour until evaluated with a dermatoscope when the red or purple colour is more easily seen. Cherry angioma may develop on any part of the body but most often appear on the scalp, face, lips and trunk.  An angioma is due to proliferating endothelial cells; these are the cells that line the inside of a blood vessel.    Angiomas can arise in early life or later in life; the most common type of angioma is a cherry angioma.  Cherry angiomas are very common in males and females of any age or race. They are more noticeable in white skin than in skin of colour. They markedly increase in " "number from about the age of 40. There may be a family history of similar lesions. Eruptive cherry angiomas have been rarely reported to be associated with internal malignancy. The cause of angiomas is unknown. Genetic analysis of cherry angiomas has shown that they frequently carry specific somatic missense mutations in the GNAQ and GNA11 (Q209H) genes, which are involved in other vascular and melanocytic proliferations.      SEBORRHEIC KERATOSIS; NON-INFLAMED    Physical Exam:  Anatomic Location Affected:  trunk  Morphological Description:  Flat and raised, waxy, smooth to warty textured, yellow to brownish-grey to dark brown to blackish, discrete, \"stuck-on\" appearing papules.  Pertinent Positives:  Pertinent Negatives:    Additional History of Present Condition:      Assessment and Plan:  Based on a thorough discussion of this condition and the management approach to it (including a comprehensive discussion of the known risks, side effects and potential benefits of treatment), the patient (family) agrees to implement the following specific plan:  Monitor for changes  Benign, reassured      Seborrheic Keratosis  A seborrheic keratosis is a harmless warty spot that appears during adult life as a common sign of skin aging.  Seborrheic keratoses can arise on any area of skin, covered or uncovered, with the usual exception of the palms and soles. They do not arise from mucous membranes. Seborrheic keratoses can have highly variable appearance.      Seborrheic keratoses are extremely common. It has been estimated that over 90% of adults over the age of 60 years have one or more of them. They occur in males and females of all races, typically beginning to erupt in the 30s or 40s. They are uncommon under the age of 20 years.  The precise cause of seborrhoeic keratoses is not known.  Seborrhoeic keratoses are considered degenerative in nature. As time goes by, seborrheic keratoses tend to become more numerous. Some " "people inherit a tendency to develop a very large number of them; some people may have hundreds of them.      There is no easy way to remove multiple lesions on a single occasion.  Unless a specific lesion is \"inflamed\" and is causing pain or stinging/burning or is bleeding, most insurance companies do not authorize treatment.    XEROSIS (\"DRY SKIN\")    Physical Exam:  Anatomic Location Affected:  diffuse  Morphological Description:  xerosis  Pertinent Positives:  Pertinent Negatives:    Additional History of Present Condition:      Assessment and Plan:  Based on a thorough discussion of this condition and the management approach to it (including a comprehensive discussion of the known risks, side effects and potential benefits of treatment), the patient (family) agrees to implement the following specific plan:  Use moisturizer like Eucerin,Cerave or Aveeno Cream 3 times a day for the dry skin            Dry skin refers to skin that feels dry to touch. Dry skin has a dull surface with a rough, scaly quality. The skin is less pliable and cracked. When dryness is severe, the skin may become inflamed and fissured.  Although any body site can be dry, dry skin tends to affect the shins more than any other site.    Dry skin is lacking moisture in the outer horny cell layer (stratum corneum) and this results in cracks in the skin surface.  Dry skin is also called xerosis, xeroderma or asteatosis (lack of fat).  It can affect males and females of all ages. There is some racial variability in water and lipid content of the skin.  Dry skin that starts in early childhood may be one of about 20 types of ichthyosis (fish-scale skin). There is often a family history of dry skin.   Dry skin is commonly seen in people with atopic dermatitis.  Nearly everyone > 60 years has dry skin.    Dry skin that begins later may be seen in people with certain diseases and conditions.  Postmenopausal women  Hypothyroidism  Chronic renal disease "   Malnutrition and weight loss   Subclinical dermatitis   Treatment with certain drugs such as oral retinoids, diuretics and epidermal growth factor receptor inhibitors      What is the treatment for dry skin?  The mainstay of treatment of dry skin and ichthyosis is moisturisers/emollients. They should be applied liberally and often enough to:  Reduce itch   Improve the barrier function   Prevent entry of irritants, bacteria   Reduce transepidermal water loss.      How can dry skin be prevented?  Eliminate aggravating factors:  Reduce the frequency of bathing.   A humidifier in winter and air conditioner in summer   Compare having a short shower with a prolonged soak in a bath.   Use lukewarm, not hot, water.   Replace standard soap with a substitute such as a synthetic detergent cleanser, water-miscible emollient, bath oil, anti-pruritic tar oil, colloidal oatmeal etc.   Apply an emollient liberally and often, particularly shortly after bathing, and when itchy. The drier the skin, the thicker this should be, especially on the hands.    What is the outlook for dry skin?  A tendency to dry skin may persist life-long, or it may improve once contributing factors are controlled.       Scribe Attestation      I,:  Gabbi Monzon MA am acting as a scribe while in the presence of the attending physician.:       I,:  Joslyn Arnold MD personally performed the services described in this documentation    as scribed in my presence.:

## 2024-11-21 ENCOUNTER — APPOINTMENT (OUTPATIENT)
Dept: URGENT CARE | Facility: MEDICAL CENTER | Age: 59
End: 2024-11-21

## 2025-02-08 ENCOUNTER — OFFICE VISIT (OUTPATIENT)
Dept: URGENT CARE | Facility: MEDICAL CENTER | Age: 60
End: 2025-02-08
Payer: COMMERCIAL

## 2025-02-08 VITALS
HEART RATE: 80 BPM | SYSTOLIC BLOOD PRESSURE: 122 MMHG | RESPIRATION RATE: 18 BRPM | TEMPERATURE: 98 F | DIASTOLIC BLOOD PRESSURE: 65 MMHG | OXYGEN SATURATION: 98 %

## 2025-02-08 DIAGNOSIS — H66.91 RIGHT OTITIS MEDIA, UNSPECIFIED OTITIS MEDIA TYPE: Primary | ICD-10-CM

## 2025-02-08 PROCEDURE — S9083 URGENT CARE CENTER GLOBAL: HCPCS | Performed by: PHYSICIAN ASSISTANT

## 2025-02-08 PROCEDURE — G0383 LEV 4 HOSP TYPE B ED VISIT: HCPCS | Performed by: PHYSICIAN ASSISTANT

## 2025-02-08 RX ORDER — PREDNISONE 20 MG/1
40 TABLET ORAL DAILY
Qty: 10 TABLET | Refills: 0 | Status: SHIPPED | OUTPATIENT
Start: 2025-02-08 | End: 2025-02-13

## 2025-02-08 RX ORDER — AMOXICILLIN 500 MG/1
500 CAPSULE ORAL EVERY 8 HOURS SCHEDULED
Qty: 21 CAPSULE | Refills: 0 | Status: SHIPPED | OUTPATIENT
Start: 2025-02-08 | End: 2025-02-15

## 2025-02-08 NOTE — PROGRESS NOTES
[facility]        NAME: Galina Liriano is a 59 y.o. female  : 1965    MRN: [id number]  DATE: 2025  TIME: 5:27 PM    Assessment and Plan   Right otitis media, unspecified otitis media type [H66.91]  1. Right otitis media, unspecified otitis media type  predniSONE 20 mg tablet    amoxicillin (AMOXIL) 500 mg capsule            Patient Instructions     Otitis media right  Prednisone once daily x 5 days  Humidifier in bedroom, steam from the shower  Amoxicillin as directed to be started if no improvement within 3 days  Follow up with PCP in 3-5 days.  Proceed to  ER if symptoms worsen.    Chief Complaint     Chief Complaint   Patient presents with    Earache     Right sided ear pain; today          History of Present Illness       59-year-old female who presents complaining of right ear pain.  Patient denies fevers, chills, chest pain, shortness of breath.  States that she uses a hearing aid on the opposite ear.    Earache         Review of Systems   Review of Systems   HENT:  Positive for ear pain.          Current Medications       Current Outpatient Medications:     amoxicillin (AMOXIL) 500 mg capsule, Take 1 capsule (500 mg total) by mouth every 8 (eight) hours for 7 days, Disp: 21 capsule, Rfl: 0    predniSONE 20 mg tablet, Take 2 tablets (40 mg total) by mouth daily for 5 days, Disp: 10 tablet, Rfl: 0    benzonatate (TESSALON PERLES) 100 mg capsule, Take 1 capsule (100 mg total) by mouth 3 (three) times a day as needed for cough, Disp: 20 capsule, Rfl: 0    Lysine 1000 MG TABS, Take by mouth, Disp: , Rfl:     multivitamin (THERAGRAN) TABS, Take 1 tablet by mouth daily, Disp: , Rfl:     OIL OF OREGANO PO, Take by mouth, Disp: , Rfl:     phenazopyridine (PYRIDIUM) 95 MG tablet, Take 1 tablet (95 mg total) by mouth 3 (three) times a day as needed for bladder spasms (Patient not taking: Reported on 2024), Disp: 10 tablet, Rfl: 0    Vitamin C-Vitamin D-Zinc (D3/VITAMIN C/ZINC PO),  Take by mouth, Disp: , Rfl:     vitamin E 100 UNIT capsule, Take 100 Units by mouth daily, Disp: , Rfl:     Current Allergies     Allergies as of 02/08/2025 - Reviewed 02/08/2025   Allergen Reaction Noted    Erythromycin Vomiting 08/23/2016            The following portions of the patient's history were reviewed and updated as appropriate: allergies, current medications, past family history, past medical history, past social history, past surgical history and problem list.     Past Medical History:   Diagnosis Date    Seasonal allergies     Skin cancer     foot       Past Surgical History:   Procedure Laterality Date    COLON SURGERY      ruptured appendix and colon - 1 foot of colon removed    LAMINECTOMY FOR IMPLANTATION / PLACEMENT NEUROSTIMULATOR ELECTRODES  2015    [facility]    CO INS/RPLC PERPH SAC/GSTRC NPG/RCVR PCKT CRTJ&CONN Right 02/26/2019    Procedure: CHANGE OF THE SACRAL NEUROSTIMULATOR IMPLANT;  Surgeon: Shala Griffith MD;  Location: [facility];  Service: Colorectal    SKIN BIOPSY         Family History   Problem Relation Age of Onset    Heart attack Mother 52        fatal    Diabetes Mother     Colon cancer Father 65    Prostate cancer Father 65    Skin cancer Father     Breast cancer Sister 55        DCIS    Cancer Maternal Grandmother         Gastric cancer    No Known Problems Maternal Grandfather     No Known Problems Paternal Grandmother     Skin cancer Paternal Grandfather     No Known Problems Maternal Aunt     No Known Problems Maternal Aunt     No Known Problems Maternal Aunt     No Known Problems Paternal Aunt     No Known Problems Paternal Aunt     No Known Problems Paternal Aunt     Breast cancer Maternal Cousin 48        DCIS         Medications have been verified.        Objective   /65   Pulse 80   Temp 98 °F (36.7 °C) (Temporal)   Resp 18   LMP 06/03/2019 (Exact Date)   SpO2 98%        Physical Exam     Physical Exam  Constitutional:       Appearance: She is well-developed.    HENT:      Head: Normocephalic and atraumatic.      Right Ear: Hearing, ear canal and external ear normal. Tympanic membrane is erythematous.      Left Ear: Hearing, tympanic membrane, ear canal and external ear normal.      Nose: Nose normal.      Mouth/Throat:      Mouth: Mucous membranes are moist.      Pharynx: Oropharynx is clear. No oropharyngeal exudate or posterior oropharyngeal erythema.   Cardiovascular:      Rate and Rhythm: Normal rate and regular rhythm.      Heart sounds: Normal heart sounds.   Pulmonary:      Effort: Pulmonary effort is normal. No respiratory distress.      Breath sounds: Normal breath sounds. No wheezing or rales.   Chest:      Chest wall: No tenderness.   Musculoskeletal:      Cervical back: Normal range of motion and neck supple.   Lymphadenopathy:      Cervical: No cervical adenopathy.           Patient advised that there is very mild erythema to the TM and would like to start on steroids to decrease pressure behind the membrane.  If there is no improvement in 3 days patient advised to start antibiotics.

## 2025-02-08 NOTE — PATIENT INSTRUCTIONS
Otitis media right  Prednisone once daily x 5 days  Humidifier in bedroom, steam from the shower  Amoxicillin as directed to be started if no improvement within 3 days  Follow up with PCP in 3-5 days.  Proceed to  ER if symptoms worsen.

## 2025-03-18 NOTE — PROGRESS NOTES
Assessment & Plan   Diagnoses and all orders for this visit:    Women's annual routine gynecological examination    Cervical cancer screening        ASSESSMENT & PLAN: Galina is a 59 y.o.  with normal gynecologic exam.    1.  Routine well woman exam done today.  2.  Cervical Cancer Screening: Pap with cotesting HPV was done today.  Current ASCCP Guidelines reviewed.   3.. Mammogram is up to date. Mammogram scheduled 2024. Recommend yearly mammograms per ACOG guidelines.  4. Patient is up to date with colonoscopy . Due .  5. Galina declined STD testing in a mutually monogamous relationship   6. I have discussed the importance of monthly self-breast exams, exercise a minimum of 150 minutes a week including weight bearing exercises and maintaining a healthy diet including adequate supplementation of Calcium and Vitamin D.   7. We reviewed treatment options for vaginal dryness and vaginal atrophy. Options include coconut oil, Vitamin E suppositories, over the counter moisturizer such as Replens and a hyaluronic acid suppository called Revaree which can be found at www.[employer]. Counseled on adding estrace vaginal cream for treatment of atrophy and GSM.   8. Return to office in 12 months for annual exam.   9. Call your insurance company to verify coverage prior to completing any ordered tests.     All questions answered to the best of my ability.      Subjective     HPI   Galina Liriano is a 59 y.o. female who presents for annual well woman exam.     GYN:  Postmenopausal  Denies vaginal bleeding   denies vaginal discharge, labial erythema or lesions.   denies vaginal itching, irritation   (+) Vaginal dryness, dyspareunia. denies at home treatments.  (+) vasomotor symptoms - night sweats but manageable   Patient is sexually active with  of 30 years.   denies gynecologic surgeries.    OB:  OB History    Para Term  AB Living   2 1 1  1 1   SAB IAB Ectopic Multiple Live Births        1      # Outcome Date GA Lbr Gurinder/2nd Weight Sex Type Anes PTL Lv   2 Term     M Vag-Spont   LUAN   1 AB               Obstetric Comments   :   M ; 1 VIP         :  denies dysuria, urinary frequency or urgency.  denies incontinence.    Breast:  denies breast mass, skin changes, dimpling, reddening, nipple retraction.  denies breast discharge.  denies breast tenderness bilaterally    Patient does have a family history of breast, endometrial, colon, or ovarian ca. Sister has history of breast cancer.      Health Maintenance:    Active lifestyle   She feels safe at home and denies domestic violence    She does follow a well balanced diet.    She does not use tobacco and social alcohol  She does follow with a PCP.    Social History:  Social History     Socioeconomic History    Marital status: /Civil Union     Spouse name: Not on file    Number of children: Not on file    Years of education: Not on file    Highest education level: Not on file   Occupational History    Not on file   Tobacco Use    Smoking status: Never    Smokeless tobacco: Never   Vaping Use    Vaping status: Never Used   Substance and Sexual Activity    Alcohol use: Yes     Comment: social    Drug use: No    Sexual activity: Yes     Partners: Male     Birth control/protection: Post-menopausal   Other Topics Concern    Not on file   Social History Narrative    Not on file     Social Drivers of Health     Financial Resource Strain: Not on file   Food Insecurity: Not on file   Transportation Needs: Not on file   Physical Activity: Not on file   Stress: Not on file   Social Connections: Not on file   Intimate Partner Violence: Not on file   Housing Stability: Not on file       Social History     Tobacco Use    Smoking status: Never    Smokeless tobacco: Never   Vaping Use    Vaping status: Never Used   Substance Use Topics    Alcohol use: Yes     Comment: social    Drug use: No       Screening:  Cervical cancer: last pap smear in  10/29/2021. Results were NILM, -HRHPV.   History of abnormal pap smears: denies  Breast cancer: last mammogram in 2024. Results were BIRADS 1.  Colon cancer: last colonoscopy in 2020 . Repeat in 10 years.   STD screening: declined.    Review of Systems   Constitutional: Negative.  Negative for activity change, appetite change and fever.   Respiratory:  Negative for shortness of breath.    Cardiovascular:  Negative for chest pain.   Gastrointestinal:  Negative for abdominal pain, constipation, diarrhea and vomiting.   Genitourinary:  Negative for dyspareunia, dysuria, frequency, menstrual problem, pelvic pain, vaginal bleeding, vaginal discharge and vaginal pain.   Skin:  Negative for color change.   Psychiatric/Behavioral: Negative.     All other systems reviewed and are negative.      The following portions of the patient's history were reviewed and updated as appropriate: allergies, current medications, past family history, past medical history, past social history, past surgical history, and problem list.         OB History          2    Para   1    Term   1            AB   1    Living   1         SAB        IAB        Ectopic        Multiple        Live Births   1           Obstetric Comments   :   M ; 1 VIP               Past Medical History:   Diagnosis Date    Allergic     Seasonal allergies     Shingles     Skin cancer     foot    Skin tag     Verruca        Past Surgical History:   Procedure Laterality Date    COLON SURGERY      ruptured appendix and colon - 1 foot of colon removed    LAMINECTOMY FOR IMPLANTATION / PLACEMENT NEUROSTIMULATOR ELECTRODES      SJ3    [place] INS/RPLC PERPH SAC/GSTRC NPG/RCVR PCKT CRTJ&CONN Right 2019    Procedure: CHANGE OF THE SACRAL NEUROSTIMULATOR IMPLANT;  Surgeon: Shala Griffith MD;  Location: [facility];  Service: Colorectal    SKIN BIOPSY         Family History   Problem Relation Age of Onset    Heart attack Mother 52         fatal    Diabetes Mother     Colon cancer Father 65    Prostate cancer Father 65    Skin cancer Father     Basal cell carcinoma Father     Cancer Father     Breast cancer Sister 55        Dcis    Clotting disorder Sister     Cancer Maternal Grandmother         Gastric cancer    No Known Problems Maternal Grandfather     No Known Problems Paternal Grandmother     Skin cancer Paternal Grandfather     No Known Problems Maternal Aunt     No Known Problems Maternal Aunt     No Known Problems Maternal Aunt     No Known Problems Paternal Aunt     No Known Problems Paternal Aunt     No Known Problems Paternal Aunt     Breast cancer Maternal Cousin 48        DCIS    Diabetes Brother     Diabetes Brother          Current Outpatient Medications:     Lysine 1000 MG TABS, Take by mouth, Disp: , Rfl:     multivitamin (THERAGRAN) TABS, Take 1 tablet by mouth daily, Disp: , Rfl:     OIL OF OREGANO PO, Take by mouth, Disp: , Rfl:     Vitamin C-Vitamin D-Zinc (D3/VITAMIN C/ZINC PO), Take by mouth, Disp: , Rfl:     vitamin E 100 UNIT capsule, Take 100 Units by mouth daily, Disp: , Rfl:     benzonatate (TESSALON PERLES) 100 mg capsule, Take 1 capsule (100 mg total) by mouth 3 (three) times a day as needed for cough, Disp: 20 capsule, Rfl: 0    phenazopyridine (PYRIDIUM) 95 MG tablet, Take 1 tablet (95 mg total) by mouth 3 (three) times a day as needed for bladder spasms (Patient not taking: Reported on 2/12/2024), Disp: 10 tablet, Rfl: 0    Allergies   Allergen Reactions    Erythromycin Vomiting       Objective   Vitals:    03/21/25 0943   BP: 130/82   BP Location: Left arm   Patient Position: Sitting   Cuff Size: Standard   Weight: 67.9 kg (149 lb 9.6 oz)     Physical Exam  Vitals and nursing note reviewed.   Constitutional:       General: She is not in acute distress.     Appearance: Normal appearance. She is not ill-appearing.   HENT:      Head: Normocephalic.   Neck:      Thyroid: No thyromegaly or thyroid tenderness.    Cardiovascular:      Rate and Rhythm: Normal rate and regular rhythm.      Heart sounds: Normal heart sounds.   Pulmonary:      Effort: Pulmonary effort is normal. No respiratory distress.      Breath sounds: Normal breath sounds.   Chest:   Breasts:     Right: Normal. No inverted nipple, nipple discharge, skin change or tenderness.      Left: Normal. No inverted nipple, nipple discharge, skin change or tenderness.   Abdominal:      General: Abdomen is flat.      Palpations: Abdomen is soft.      Tenderness: There is no abdominal tenderness. There is no guarding.   Genitourinary:     General: Normal vulva.      Exam position: Lithotomy position.      Labia:         Right: No rash, tenderness or lesion.         Left: No rash, tenderness or lesion.       Urethra: No prolapse.      Vagina: Normal. No vaginal discharge, erythema, tenderness or lesions.      Cervix: Friability present. No cervical motion tenderness, discharge or lesion.      Uterus: Not tender and no uterine prolapse.       Adnexa:         Right: No tenderness.          Left: No tenderness.        Comments: Moderate vaginal atrophy.   Musculoskeletal:      Cervical back: Neck supple.   Lymphadenopathy:      Cervical: No cervical adenopathy.   Skin:     General: Skin is warm and dry.      Capillary Refill: Capillary refill takes less than 2 seconds.   Neurological:      General: No focal deficit present.      Mental Status: She is alert and oriented to person, place, and time.   Psychiatric:         Mood and Affect: Mood normal.         Behavior: Behavior normal.         Thought Content: Thought content normal.         Gabbi MCALLISTER  OB/GYN  3/21/2025  10:14 AM

## 2025-03-21 ENCOUNTER — ANNUAL EXAM (OUTPATIENT)
Dept: OBGYN CLINIC | Facility: CLINIC | Age: 60
End: 2025-03-21
Payer: COMMERCIAL

## 2025-03-21 VITALS — WEIGHT: 149.6 LBS | SYSTOLIC BLOOD PRESSURE: 130 MMHG | BODY MASS INDEX: 24.89 KG/M2 | DIASTOLIC BLOOD PRESSURE: 82 MMHG

## 2025-03-21 DIAGNOSIS — Z12.4 CERVICAL CANCER SCREENING: ICD-10-CM

## 2025-03-21 DIAGNOSIS — Z01.419 WOMEN'S ANNUAL ROUTINE GYNECOLOGICAL EXAMINATION: Primary | ICD-10-CM

## 2025-03-21 PROCEDURE — G0145 SCR C/V CYTO,THINLAYER,RESCR: HCPCS

## 2025-03-21 PROCEDURE — G0476 HPV COMBO ASSAY CA SCREEN: HCPCS

## 2025-03-21 PROCEDURE — S0612 ANNUAL GYNECOLOGICAL EXAMINA: HCPCS

## 2025-03-24 ENCOUNTER — RESULTS FOLLOW-UP (OUTPATIENT)
Dept: OBGYN CLINIC | Facility: CLINIC | Age: 60
End: 2025-03-24

## 2025-03-26 LAB
LAB AP GYN PRIMARY INTERPRETATION: NORMAL
Lab: NORMAL

## 2025-06-12 ENCOUNTER — HOSPITAL ENCOUNTER (OUTPATIENT)
Dept: RADIOLOGY | Age: 60
Discharge: HOME | End: 2025-06-12
Attending: STUDENT IN AN ORGANIZED HEALTH CARE EDUCATION/TRAINING PROGRAM
Payer: COMMERCIAL

## 2025-06-12 DIAGNOSIS — Z12.31 ENCOUNTER FOR SCREENING MAMMOGRAM FOR MALIGNANT NEOPLASM OF BREAST: ICD-10-CM

## 2025-06-12 PROCEDURE — 77067 SCR MAMMO BI INCL CAD: CPT

## 2025-06-12 PROCEDURE — 77063 BREAST TOMOSYNTHESIS BI: CPT

## 2025-06-16 ENCOUNTER — RESULTS FOLLOW-UP (OUTPATIENT)
Dept: LABOR AND DELIVERY | Facility: HOSPITAL | Age: 60
End: 2025-06-16

## (undated) DEVICE — 3M™ TEGADERM™ TRANSPARENT FILM DRESSING FRAME STYLE, 1626W, 4 IN X 4-3/4 IN (10 CM X 12 CM), 50/CT 4CT/CASE: Brand: 3M™ TEGADERM™

## (undated) DEVICE — 3000CC GUARDIAN II: Brand: GUARDIAN

## (undated) DEVICE — CHLORAPREP HI-LITE 26ML ORANGE

## (undated) DEVICE — PROGRAMMER INTERSTIM SMART KIT

## (undated) DEVICE — DRAPE SHEET THREE QUARTER

## (undated) DEVICE — DRAPE CAMERA/LASER

## (undated) DEVICE — BETHLEHEM UNIVERSAL MINOR GEN: Brand: CARDINAL HEALTH

## (undated) DEVICE — 3M™ IOBAN™ 2 ANTIMICROBIAL INCISE DRAPE 6650EZ: Brand: IOBAN™ 2

## (undated) DEVICE — INTENDED FOR TISSUE SEPARATION, AND OTHER PROCEDURES THAT REQUIRE A SHARP SURGICAL BLADE TO PUNCTURE OR CUT.: Brand: BARD-PARKER SAFETY BLADES SIZE 11, STERILE

## (undated) DEVICE — SUT PROLENE 0 CT-1 30 IN 8424H

## (undated) DEVICE — ADHESIVE SKN CLSR HISTOACRYL FLEX 0.5ML LF

## (undated) DEVICE — INTENDED FOR TISSUE SEPARATION, AND OTHER PROCEDURES THAT REQUIRE A SHARP SURGICAL BLADE TO PUNCTURE OR CUT.: Brand: BARD-PARKER SAFETY BLADES SIZE 15, STERILE

## (undated) DEVICE — GLOVE SRG BIOGEL ECLIPSE 7.5

## (undated) DEVICE — PENCIL ELECTROSURG E-Z CLEAN -0035H

## (undated) DEVICE — SUT MONOCRYL 4-0 PS-2 18 IN Y496G